# Patient Record
Sex: FEMALE | Race: WHITE | NOT HISPANIC OR LATINO | Employment: OTHER | URBAN - METROPOLITAN AREA
[De-identification: names, ages, dates, MRNs, and addresses within clinical notes are randomized per-mention and may not be internally consistent; named-entity substitution may affect disease eponyms.]

---

## 2018-03-26 ENCOUNTER — TRANSFERRED RECORDS (OUTPATIENT)
Dept: HEALTH INFORMATION MANAGEMENT | Facility: CLINIC | Age: 68
End: 2018-03-26

## 2018-03-26 ENCOUNTER — HOSPITAL ENCOUNTER (EMERGENCY)
Facility: CLINIC | Age: 68
Discharge: HOME | End: 2018-03-26
Attending: INTERNAL MEDICINE | Admitting: INTERNAL MEDICINE
Payer: MEDICARE

## 2018-03-26 ENCOUNTER — APPOINTMENT (OUTPATIENT)
Dept: CT IMAGING | Facility: CLINIC | Age: 68
End: 2018-03-26
Attending: INTERNAL MEDICINE
Payer: MEDICARE

## 2018-03-26 VITALS
WEIGHT: 186 LBS | DIASTOLIC BLOOD PRESSURE: 64 MMHG | TEMPERATURE: 97.4 F | OXYGEN SATURATION: 96 % | SYSTOLIC BLOOD PRESSURE: 111 MMHG | RESPIRATION RATE: 18 BRPM

## 2018-03-26 DIAGNOSIS — J20.9 BRONCHITIS, ACUTE, WITH BRONCHOSPASM: ICD-10-CM

## 2018-03-26 LAB
ALBUMIN SERPL-MCNC: 3.5 G/DL (ref 3.4–5)
ALP SERPL-CCNC: 94 U/L (ref 40–150)
ALT SERPL W P-5'-P-CCNC: 16 U/L (ref 0–50)
ANION GAP SERPL CALCULATED.3IONS-SCNC: 5 MMOL/L (ref 3–14)
AST SERPL W P-5'-P-CCNC: 15 U/L (ref 0–45)
BASOPHILS # BLD AUTO: 0 10E9/L (ref 0–0.2)
BASOPHILS NFR BLD AUTO: 0.8 %
BILIRUB SERPL-MCNC: 0.5 MG/DL (ref 0.2–1.3)
BUN SERPL-MCNC: 18 MG/DL (ref 7–30)
CALCIUM SERPL-MCNC: 8.4 MG/DL (ref 8.5–10.1)
CHLORIDE SERPL-SCNC: 109 MMOL/L (ref 94–109)
CO2 SERPL-SCNC: 28 MMOL/L (ref 20–32)
CREAT BLD-MCNC: 1 MG/DL (ref 0.52–1.04)
CREAT SERPL-MCNC: 0.94 MG/DL (ref 0.52–1.04)
DIFFERENTIAL METHOD BLD: ABNORMAL
EOSINOPHIL # BLD AUTO: 0.1 10E9/L (ref 0–0.7)
EOSINOPHIL NFR BLD AUTO: 2.9 %
ERYTHROCYTE [DISTWIDTH] IN BLOOD BY AUTOMATED COUNT: 13.2 % (ref 10–15)
GFR SERPL CREATININE-BSD FRML MDRD: 55 ML/MIN/1.7M2
GFR SERPL CREATININE-BSD FRML MDRD: 59 ML/MIN/1.7M2
GLUCOSE SERPL-MCNC: 102 MG/DL (ref 70–99)
HCT VFR BLD AUTO: 38.1 % (ref 35–47)
HGB BLD-MCNC: 11.8 G/DL (ref 11.7–15.7)
IMM GRANULOCYTES # BLD: 0 10E9/L (ref 0–0.4)
IMM GRANULOCYTES NFR BLD: 0.2 %
LYMPHOCYTES # BLD AUTO: 2.7 10E9/L (ref 0.8–5.3)
LYMPHOCYTES NFR BLD AUTO: 55.9 %
MCH RBC QN AUTO: 27.6 PG (ref 26.5–33)
MCHC RBC AUTO-ENTMCNC: 31 G/DL (ref 31.5–36.5)
MCV RBC AUTO: 89 FL (ref 78–100)
MONOCYTES # BLD AUTO: 0.4 10E9/L (ref 0–1.3)
MONOCYTES NFR BLD AUTO: 7.7 %
NEUTROPHILS # BLD AUTO: 1.6 10E9/L (ref 1.6–8.3)
NEUTROPHILS NFR BLD AUTO: 32.5 %
NRBC # BLD AUTO: 0 10*3/UL
NRBC BLD AUTO-RTO: 0 /100
PLATELET # BLD AUTO: 300 10E9/L (ref 150–450)
POTASSIUM SERPL-SCNC: 3.7 MMOL/L (ref 3.4–5.3)
PROT SERPL-MCNC: 6.9 G/DL (ref 6.8–8.8)
RBC # BLD AUTO: 4.28 10E12/L (ref 3.8–5.2)
SODIUM SERPL-SCNC: 142 MMOL/L (ref 133–144)
TROPONIN I SERPL-MCNC: <0.015 UG/L (ref 0–0.04)
WBC # BLD AUTO: 4.8 10E9/L (ref 4–11)

## 2018-03-26 PROCEDURE — 82565 ASSAY OF CREATININE: CPT

## 2018-03-26 PROCEDURE — 93005 ELECTROCARDIOGRAM TRACING: CPT

## 2018-03-26 PROCEDURE — 40000275 ZZH STATISTIC RCP TIME EA 10 MIN

## 2018-03-26 PROCEDURE — 80053 COMPREHEN METABOLIC PANEL: CPT | Performed by: INTERNAL MEDICINE

## 2018-03-26 PROCEDURE — 99285 EMERGENCY DEPT VISIT HI MDM: CPT | Mod: 25

## 2018-03-26 PROCEDURE — 71260 CT THORAX DX C+: CPT

## 2018-03-26 PROCEDURE — 93005 ELECTROCARDIOGRAM TRACING: CPT | Mod: 76

## 2018-03-26 PROCEDURE — 85025 COMPLETE CBC W/AUTO DIFF WBC: CPT | Performed by: INTERNAL MEDICINE

## 2018-03-26 PROCEDURE — 25000128 H RX IP 250 OP 636: Performed by: INTERNAL MEDICINE

## 2018-03-26 PROCEDURE — 84484 ASSAY OF TROPONIN QUANT: CPT | Performed by: INTERNAL MEDICINE

## 2018-03-26 PROCEDURE — 25000125 ZZHC RX 250: Performed by: INTERNAL MEDICINE

## 2018-03-26 PROCEDURE — 94640 AIRWAY INHALATION TREATMENT: CPT

## 2018-03-26 RX ORDER — IPRATROPIUM BROMIDE AND ALBUTEROL SULFATE 2.5; .5 MG/3ML; MG/3ML
3 SOLUTION RESPIRATORY (INHALATION) ONCE
Status: COMPLETED | OUTPATIENT
Start: 2018-03-26 | End: 2018-03-26

## 2018-03-26 RX ORDER — IOPAMIDOL 755 MG/ML
500 INJECTION, SOLUTION INTRAVASCULAR ONCE
Status: COMPLETED | OUTPATIENT
Start: 2018-03-26 | End: 2018-03-26

## 2018-03-26 RX ORDER — ALBUTEROL SULFATE 90 UG/1
2 AEROSOL, METERED RESPIRATORY (INHALATION) EVERY 4 HOURS PRN
Qty: 1 INHALER | Refills: 0 | Status: SHIPPED | OUTPATIENT
Start: 2018-03-26

## 2018-03-26 RX ORDER — PREDNISONE 20 MG/1
60 TABLET ORAL ONCE
Status: COMPLETED | OUTPATIENT
Start: 2018-03-26 | End: 2018-03-26

## 2018-03-26 RX ORDER — PREDNISONE 20 MG/1
TABLET ORAL
Qty: 10 TABLET | Refills: 0 | Status: SHIPPED | OUTPATIENT
Start: 2018-03-26 | End: 2023-09-24

## 2018-03-26 RX ORDER — AZITHROMYCIN 250 MG/1
TABLET, FILM COATED ORAL
Qty: 6 TABLET | Refills: 0 | Status: SHIPPED | OUTPATIENT
Start: 2018-03-26 | End: 2018-03-31

## 2018-03-26 RX ADMIN — IPRATROPIUM BROMIDE AND ALBUTEROL SULFATE 3 ML: .5; 3 SOLUTION RESPIRATORY (INHALATION) at 22:27

## 2018-03-26 RX ADMIN — PREDNISONE 60 MG: 20 TABLET ORAL at 22:35

## 2018-03-26 RX ADMIN — IPRATROPIUM BROMIDE AND ALBUTEROL SULFATE 3 ML: .5; 3 SOLUTION RESPIRATORY (INHALATION) at 21:05

## 2018-03-26 RX ADMIN — SODIUM CHLORIDE 83 ML: 9 INJECTION, SOLUTION INTRAVENOUS at 21:56

## 2018-03-26 RX ADMIN — IOPAMIDOL 69 ML: 755 INJECTION, SOLUTION INTRAVENOUS at 21:56

## 2018-03-26 ASSESSMENT — ENCOUNTER SYMPTOMS
COUGH: 1
BACK PAIN: 1
SHORTNESS OF BREATH: 1
NERVOUS/ANXIOUS: 1

## 2018-03-26 NOTE — ED AVS SNAPSHOT
[facility] Emergency Department    [address]    [facility] [address]    Phone:  [phone]    Fax:  [phone]                                       Ghada Dillon   MRN: [id number]    Department:  [facility] Emergency Department   Date of Visit:  3/26/2018           After Visit Summary Signature Page     I have received my discharge instructions, and my questions have been answered. I have discussed any challenges I see with this plan with the nurse or doctor.    ..........................................................................................................................................  Patient/Patient Representative Signature      ..........................................................................................................................................  Patient Representative Print Name and Relationship to Patient    ..................................................               ................................................  Date                                            Time    ..........................................................................................................................................  Reviewed by Signature/Title    ...................................................              ..............................................  Date                                                            Time

## 2018-03-26 NOTE — ED AVS SNAPSHOT
[facility] Emergency Department    [address] [place] [address]    Phone:  [phone]    Fax:  [phone]                                       Ghada Dillon   MRN: [id number]    Department:  [facility] Emergency Department   Date of Visit:  3/26/2018           Patient Information     Date Of Birth          1950        Your diagnoses for this visit were:     Bronchitis, acute, with bronchospasm        You were seen by Cesilia Romero MD.        Discharge Instructions       Discharge Instructions  Bronchitis, Pneumonia, Bronchospasm    You were seen today for a chest infection or inflammation. If your doctor decided this was due to a bacterial infection, you may need an antibiotic. Sometimes these are caused by a virus, and then an antibiotic will not help.     Return to the Emergency Department if:    Your breathing gets much worse.    You are very weak, or feel much more ill.    You develop new symptoms, such as chest pain.    You cough up blood.    You are vomiting enough that you can t keep fluids or your medicine down.    What can I do to help myself?    Fill any prescriptions the doctor gave you and take them right away--especially antibiotics. Be sure to finish the whole antibiotic prescription.    You may be given a prescription for an inhaler, which can help loosen tight air passages.  Use this as needed, but not more often than directed. Inhalers work much better when used with a spacer.     You may be given a prescription for a steroid to reduce inflammation. Used long-term, these can have many serious side effects, but for short courses these do not happen. You may notice restlessness or increased appetite.        You may use non-prescription cough or cold medicines. Cough medicines may help, but don t make the cough go away completely.     Avoid smoke, because this can make your symptoms worse. If you smoke, this may be a good time to  "quit! Consider using nicotine lozenges, gum, or patches to reduce cravings.     If you have a fever, Tylenol  (acetaminophen), Motrin  (ibuprofen), or Advil  (ibuprofen) may help bring fever down and may help you feel more comfortable. Be sure to read and follow the package directions, and ask your doctor if you have questions.    Be sure to get your flu shot each year.  The pneumonia shot can help prevent pneumonia.  Probiotics: If you have been given an antibiotic, you may want to also take a probiotic pill or eat yogurt with live cultures. Probiotics have \"good bacteria\" to help your intestines stay healthy. Studies have shown that probiotics help prevent diarrhea and other intestine problems (including C. diff infection) when you take antibiotics. You can buy these without a prescription in the pharmacy section of the store.     If your doctor has told you to follow-up at your clinic, be sure to call right away and go to your appointment.  If there is any problem with keeping your appointment, call your doctor or return to the Emergency Department.    If you were given a prescription for medicine here today, be sure to read all of the information (including the package insert) that comes with your prescription.  This will include important information about the medicine, its side effects, and any warnings that you need to know about.  The pharmacist who fills the prescription can provide more information and answer questions you may have about the medicine.  If you have questions or concerns that the pharmacist cannot address, please call or return to the Emergency Department.       Remember that you can always come back to the Emergency Department if you are not able to see your regular doctor in the amount of time listed above, if you get any new symptoms, or if there is anything that worries you.        24 Hour Appointment Hotline       To make an appointment at any [facility], call [phone] ([phone]). If you don't have a family doctor or clinic, we will help you find one. [facility] clinics are conveniently located to serve the needs of you and your family.          ED Discharge Orders     SPACER BAG/RESERVOIR                    Review of your medicines      START taking        Dose / Directions Last dose taken    albuterol 108 (90 BASE) MCG/ACT Inhaler   Commonly known as:  PROAIR HFA   Dose:  2 puff   Quantity:  1 Inhaler        Inhale 2 puffs into the lungs every 4 hours as needed for shortness of breath / dyspnea   Refills:  0        azithromycin 250 MG tablet   Commonly known as:  ZITHROMAX Z-GREER   Quantity:  6 tablet        Two tablets on the first day, then one tablet daily for the next 4 days   Refills:  0        predniSONE 20 MG tablet   Commonly known as:  DELTASONE   Quantity:  10 tablet        Take two tablets (= 40mg) each day for 5 (five) days   Refills:  0          Our records show that you are taking the medicines listed below. If these are incorrect, please call your family doctor or clinic.        Dose / Directions Last dose taken    ALBUTEROL IN        Refills:  0        conjugated estrogens cream   Commonly known as:  PREMARIN        Place vaginally once a week   Refills:  0        latanoprost 0.005 % ophthalmic solution   Commonly known as:  XALATAN   Dose:  1 drop        1 drop At Bedtime   Refills:  0                Prescriptions were sent or printed at these locations (3 Prescriptions)                   Other Prescriptions                Printed at Department/Unit printer (3 of 3)         predniSONE (DELTASONE) 20 MG tablet               albuterol (PROAIR HFA) 108 (90 BASE) MCG/ACT Inhaler               azithromycin (ZITHROMAX Z-GREER) 250 MG tablet                Procedures and tests performed during your visit     CBC with platelets differential    CT Chest Pulmonary Embolism w Contrast    Cardiac Continuous Monitoring    Comprehensive metabolic panel    Creatinine POCT     ISTAT creatinine    Peripheral IV: Standard    Pulse oximetry nursing    Troponin I      Orders Needing Specimen Collection     None      Pending Results     No orders found from 3/24/2018 to 3/27/2018.            Pending Culture Results     No orders found from 3/24/2018 to 3/27/2018.            Pending Results Instructions     If you had any lab results that were not finalized at the time of your Discharge, you can call the ED Lab Result RN at [phone]. You will be contacted by this team for any positive Lab results or changes in treatment. The nurses are available 7 days a week from 10A to 6:30P.  You can leave a message 24 hours per day and they will return your call.        Test Results From Your Hospital Stay        3/26/2018  9:24 PM      Component Results     Component Value Ref Range & Units Status    WBC 4.8 4.0 - 11.0 10e9/L Final    RBC Count 4.28 3.8 - 5.2 10e12/L Final    Hemoglobin 11.8 11.7 - 15.7 g/dL Final    Hematocrit 38.1 35.0 - 47.0 % Final    MCV 89 78 - 100 fl Final    MCH 27.6 26.5 - 33.0 pg Final    MCHC 31.0 (L) 31.5 - 36.5 g/dL Final    RDW 13.2 10.0 - 15.0 % Final    Platelet Count 300 150 - 450 10e9/L Final    Diff Method Automated Method  Final    % Neutrophils 32.5 % Final    % Lymphocytes 55.9 % Final    % Monocytes 7.7 % Final    % Eosinophils 2.9 % Final    % Basophils 0.8 % Final    % Immature Granulocytes 0.2 % Final    Nucleated RBCs 0 0 /100 Final    Absolute Neutrophil 1.6 1.6 - 8.3 10e9/L Final    Absolute Lymphocytes 2.7 0.8 - 5.3 10e9/L Final    Absolute Monocytes 0.4 0.0 - 1.3 10e9/L Final    Absolute Eosinophils 0.1 0.0 - 0.7 10e9/L Final    Absolute Basophils 0.0 0.0 - 0.2 10e9/L Final    Abs Immature Granulocytes 0.0 0 - 0.4 10e9/L Final    Absolute Nucleated RBC 0.0  Final         3/26/2018  9:43 PM      Component Results     Component Value Ref Range & Units Status    Troponin I ES <0.015 0.000 - 0.045 ug/L Final    The 99th percentile for upper reference range is  0.045 ug/L.  Troponin values   in the range of 0.045 - 0.120 ug/L may be associated with risks of adverse   clinical events.           3/26/2018  9:43 PM      Component Results     Component Value Ref Range & Units Status    Sodium 142 133 - 144 mmol/L Final    Potassium 3.7 3.4 - 5.3 mmol/L Final    Chloride 109 94 - 109 mmol/L Final    Carbon Dioxide 28 20 - 32 mmol/L Final    Anion Gap 5 3 - 14 mmol/L Final    Glucose 102 (H) 70 - 99 mg/dL Final    Urea Nitrogen 18 7 - 30 mg/dL Final    Creatinine 0.94 0.52 - 1.04 mg/dL Final    GFR Estimate 59 (L) >60 mL/min/1.7m2 Final    Non  GFR Calc    GFR Estimate If Black 72 >60 mL/min/1.7m2 Final    African American GFR Calc    Calcium 8.4 (L) 8.5 - 10.1 mg/dL Final    Bilirubin Total 0.5 0.2 - 1.3 mg/dL Final    Albumin 3.5 3.4 - 5.0 g/dL Final    Protein Total 6.9 6.8 - 8.8 g/dL Final    Alkaline Phosphatase 94 40 - 150 U/L Final    ALT 16 0 - 50 U/L Final    AST 15 0 - 45 U/L Final         3/26/2018 10:37 PM      Narrative     CT CHEST WITH CONTRAST  3/26/2018 10:08 PM    HISTORY: Chest pain and shortness of breath. Evaluate for pulmonary  embolism.    COMPARISON: Radiographs on 11/21/2004.    TECHNIQUE: Routine transverse CT imaging of the chest was performed  following the uneventful intravenous administration of 69mL Isovue-370  contrast. A pulmonary embolism protocol was utilized. Radiation dose  for this scan was reduced using automated exposure control, adjustment  of the mA and/or kV according to patient size, or iterative  reconstruction technique.    FINDINGS: The heart size is mildly enlarged. There is an intrathoracic  goiter on the left. No enlarged lymph node or other abnormal  mediastinal mass is seen. The thoracic aorta is unremarkable. There is  very good opacification of the pulmonary arteries with contrast. No  pulmonary embolism is seen. The lungs are clear. No pneumothorax is  demonstrated. No pleural effusion is identified. There  are  degenerative changes in the spine. No chest wall pathology is seen.  There are surgical changes in the visualized upper abdomen as well as  what appears to be a large right renal cyst.        Impression     IMPRESSION: No acute abnormality is demonstrated. Specifically, no  pulmonary embolism is seen.    ANETA GRANDA MD         3/26/2018  9:18 PM      Component Results     Component Value Ref Range & Units Status    Creatinine 1.0 0.52 - 1.04 mg/dL Final    GFR Estimate 55 (L) >60 mL/min/1.7m2 Final    GFR Estimate If Black 67 >60 mL/min/1.7m2 Final                Clinical Quality Measure: Blood Pressure Screening     Your blood pressure was checked while you were in the emergency department today. The last reading we obtained was  BP: 111/64 (Simultaneous filing. User may not have seen previous data.) . Please read the guidelines below about what these numbers mean and what you should do about them.  If your systolic blood pressure (the top number) is less than 120 and your diastolic blood pressure (the bottom number) is less than 80, then your blood pressure is normal. There is nothing more that you need to do about it.  If your systolic blood pressure (the top number) is 120-139 or your diastolic blood pressure (the bottom number) is 80-89, your blood pressure may be higher than it should be. You should have your blood pressure rechecked within a year by a primary care provider.  If your systolic blood pressure (the top number) is 140 or greater or your diastolic blood pressure (the bottom number) is 90 or greater, you may have high blood pressure. High blood pressure is treatable, but if left untreated over time it can put you at risk for heart attack, stroke, or kidney failure. You should have your blood pressure rechecked by a primary care provider within the next 4 weeks.  If your provider in the emergency department today gave you specific instructions to follow-up with your doctor or provider  "even sooner than that, you should follow that instruction and not wait for up to 4 weeks for your follow-up visit.        Thank you for choosing [facility]       Thank you for choosing [facility] for your care. Our goal is always to provide you with excellent care. Hearing back from our patients is one way we can continue to improve our services. Please take a few minutes to complete the written survey that you may receive in the mail after you visit with us. Thank you!        [employer] Information     [employer] lets you send messages to your doctor, view your test results, renew your prescriptions, schedule appointments and more. To sign up, go to [email or web address] . Click on \"Log in\" on the left side of the screen, which will take you to the Welcome page. Then click on \"Sign up Now\" on the right side of the page.     You will be asked to enter the access code listed below, as well as some personal information. Please follow the directions to create your username and password.     Your access code is: [email or web address]  Expires: 2018 11:31 PM     Your access code will  in 90 days. If you need help or a new code, please call your [facility] clinic or [phone].        Care EveryWhere ID     This is your Care EveryWhere ID. This could be used by other organizations to access your [facility] medical records  [id number]        Equal Access to Services     SHIV HILL : Hadii jhonny Uribe, waaxda luqadaha, qaybta kaalmada sernia, fany lacy. So [facility] [phone].    ATENCIÓN: Si habla español, tiene a kumar disposición servicios gratuitos de asistencia lingüística. Llame al [phone].    We comply with applicable federal civil rights laws and [place] laws. We do not discriminate on the basis of race, color, national origin, age, disability, sex, sexual orientation, or gender identity.            After Visit Summary       This is your record. Keep this with you and " show to your community pharmacist(s) and doctor(s) at your next visit.

## 2018-03-27 LAB
INTERPRETATION ECG - MUSE: NORMAL
INTERPRETATION ECG - MUSE: NORMAL

## 2018-03-27 NOTE — ED PROVIDER NOTES
"  History     Chief Complaint:  Chest Pain and Shortness of Breath     The history is provided by the patient.      Ghada Dillon is a 67 year old female who presents with chest pain and shortness of breath. The patient states that she \"caught an infection\" from a flight from [place] on 2/28/18 and she started taking Mucinex and ibuprofen. Since then she has been having a productive cough with yellow phlegm when she lies flat and intermittent chest pain and shortness of breath. Her chest pain and shortness of breath worsened 2 days ago and she noticed it while she was lying on her right side at bed time. She states it felt like her chest was thumping, almost like anxiety, and went into her back. She went to an urgent care and had chest XR obtained. She was then referred to the ED for further evaluation. Of note, the patient's flight from [place] was delayed and she ended up sitting in the plane for a total of 8 hours. She has no other medical concerns.    XR Chest 2 Views  Negative chest. Both lungs clear.    JAN CELAYA MD    CARDIAC RISK FACTORS:  Sex:    Female  Tobacco:   rare  Hypertension:   Neg  Hyperlipidemia:  Neg  Diabetes:   Neg  Family History:  Pos    PE/DVT RISK FACTORS:  Sex:    Female  Hormones:   Neg  Tobacco:   Neg  Cancer:   Neg  Travel:   Pos  Surgery:   Neg  Other immobilization: Neg  Personal history:  Neg  Family history:  Neg     Allergies:  No known drug allergies      Medications:    Albuterol inhaler    Past Medical History:    Uncomplicated asthma    Past Surgical History:    Eye surgery    Family History:    Heart disease  Diabetes    Social History:  Presents alone   Tobacco use: Current some day smoker  Alcohol use: No  PCP: Physician No Ref-Primary    Marital Status:        Review of Systems   Respiratory: Positive for cough and shortness of breath.    Cardiovascular: Positive for chest pain.   Musculoskeletal: Positive for back pain.   Psychiatric/Behavioral: The " patient is nervous/anxious.    All other systems reviewed and are negative.    Physical Exam     Patient Vitals for the past 24 hrs:   BP Temp Temp src Heart Rate Resp SpO2 Weight   03/26/18 2300 111/64 - - 82 18 96 % -   03/26/18 2245 121/74 - - 86 8 99 % -   03/26/18 2230 106/81 - - 64 (!) 5 100 % -   03/26/18 2227 - - - - - 98 % -   03/26/18 2221 - - - 71 - 96 % -   03/26/18 2215 129/90 - - 66 9 96 % -   03/26/18 2210 - - - 70 - 99 % -   03/26/18 2209 123/87 - - - - - -   03/26/18 2145 - - - - - 97 % -   03/26/18 2130 122/67 - - - - 96 % -   03/26/18 2115 122/70 - - - - 97 % -   03/26/18 2026 143/79 97.4  F (36.3  C) Oral 85 16 100 % 84.4 kg (186 lb)      Physical Exam   Constitutional: She is cooperative.   HENT:   Right Ear: Tympanic membrane normal.   Left Ear: Tympanic membrane normal.   Mouth/Throat: Oropharynx is clear and moist and mucous membranes are normal.   Eyes: Conjunctivae are normal.   Neck: Normal range of motion.   Cardiovascular: Regular rhythm and normal heart sounds.    Pulmonary/Chest: Effort normal. She has wheezes.   Abdominal: Soft. Normal appearance and bowel sounds are normal. There is no rebound and no guarding.   Musculoskeletal: Normal range of motion.   Lymphadenopathy:     She has no cervical adenopathy.   Neurological: She is alert.   Skin: Skin is warm and dry.   Psychiatric: She has a normal mood and affect.         HEART SCORE:    History:   Highly suspicious (2)          Moderately suspicious (1)    1     Slightly suspicious (0)         ECG:   Significant ST depression(1mm continguous)      Non-specific repolarization abnormality  1     Normal          Age:   > 65       2     45-65            < 45           Risk Factors:  3 or more           1-2       1     No risk factors          Troponin:   > 3x normal limit     1-3x normal limit     < normal limit      0    Total:           5      **Risk factors: DM, current or recent smoker (< 90 days), HTN, hyperlipidemia, Fam hx of CAD,  BMI > 30, history of atheroslcerosis    A HEART score of < 3 places their risk of major adverse cardiac event at about 1.7% at 6 weeks (<1% at 30 days).  If repeated troponin at 3 hours likely reduces risk to less than 1%.       Emergency Department Course   ECG (20:35:08):  Rate 70 bpm. NC interval 184. QRS duration 88. QT/QTc 420/453. P-R-T axes 33 -16 8. Normal sinus rhythm. T wave abnormality, consider anterior ischemia. Abnormal ECG. Agree with computer interpretation. Interpreted at 2059 by Cesilia Romero MD.     ECG (22:18:45):  Rate 70 bpm. NC interval 196. QRS duration 84. QT/QTc 434/468. P-R-T axes 38 -20 1. Normal sinus rhythm. Nonspecific T wave abnormality. Abnormal ECG. Agree with computer interpretation. No significant change when compared to earlier EKG.  Interpreted at 2220 by Cesilia Romero MD.     Imaging:  Radiographic findings were communicated with the patient who voiced understanding of the findings.  CT Chest Pulmonary Embolism w Contrast  IMPRESSION: No acute abnormality is demonstrated. Specifically, no pulmonary embolism is seen.    ANETA GRANDA MD    Imaging independently reviewed and agree with radiologist interpretation.      Laboratory:  CBC: WNL (WBC 4.8, HGB 11.8, )    CMP: Glucose 102 (H), GFR Estimate 59 (L), Calcium 8.4 (L), o/w WNL (Creatinine 0.94)   Creatinine POCT: 1.0, GFR Estimate 55 (L)  2113: Troponin: <0.015     Interventions:  2105: Duoneb 3 mLs Nebulization  2227: Duoneb 3 mLs Nebulization  2235: Deltasone 60 mg PO    Emergency Department Course:  Past medical records, nursing notes, and vitals reviewed.  2051: I performed an exam of the patient and obtained history, as documented above.      2259: I rechecked the patient. Findings and plan explained to the Patient. Patient discharged home with instructions regarding supportive care, medications, and reasons to return. The importance of close follow-up was reviewed.      I personally reviewed  the laboratory results with the patient and answered all related questions prior to discharge.   Impression & Plan    Medical Decision Making:  Ghada Dillon is a 67 year old female who was referred from a clinic for further evaluation of chest pain and dyspnea. With her recent prolonged travel there was concern for PE. Thankfully CT Chest rules this out. She does have somewhat abnormal EKG suggestive for ischemia. This is unchanging while here. Her troponin is negative after 2 days of ongoing chest symptoms ruling out cardiac ischemia. Here she has had wheezing and after 2 nebs and steroids she is feeling markedly improved. I suspect acute bronchitis with bronchospasm. I will discharged the patient with albuterol and 5 day course of prednisone as well as azithromycin. Follow up in clinic in the next few days or return for new or worsening symptoms.       Diagnosis:    ICD-10-CM    1. Bronchitis, acute, with bronchospasm J20.9 SPACER BAG/RESERVOIR       Disposition:  Discharged to home with plan as outlined.    Discharge Medications:  New Prescriptions    ALBUTEROL (PROAIR HFA) 108 (90 BASE) MCG/ACT INHALER    Inhale 2 puffs into the lungs every 4 hours as needed for shortness of breath / dyspnea    AZITHROMYCIN (ZITHROMAX Z-GREER) 250 MG TABLET    Two tablets on the first day, then one tablet daily for the next 4 days    PREDNISONE (DELTASONE) 20 MG TABLET    Take two tablets (= 40mg) each day for 5 (five) days         Aquilino Noriega  3/26/2018   [facility] EMERGENCY DEPARTMENT  I, Aquilino Noriega, am serving as a scribe at 8:51 PM on 3/26/2018 to document services personally performed by Cesilia Romero MD based on my observations and the provider's statements to me.       Cesilia Romero MD  03/27/18 0059

## 2018-04-01 ENCOUNTER — HOSPITAL ENCOUNTER (EMERGENCY)
Facility: CLINIC | Age: 68
Discharge: HOME | End: 2018-04-01
Attending: EMERGENCY MEDICINE | Admitting: EMERGENCY MEDICINE
Payer: MEDICARE

## 2018-04-01 ENCOUNTER — APPOINTMENT (OUTPATIENT)
Dept: GENERAL RADIOLOGY | Facility: CLINIC | Age: 68
End: 2018-04-01
Attending: EMERGENCY MEDICINE
Payer: MEDICARE

## 2018-04-01 VITALS
WEIGHT: 186 LBS | DIASTOLIC BLOOD PRESSURE: 95 MMHG | RESPIRATION RATE: 18 BRPM | SYSTOLIC BLOOD PRESSURE: 147 MMHG | BODY MASS INDEX: 30.99 KG/M2 | OXYGEN SATURATION: 99 % | HEART RATE: 57 BPM | HEIGHT: 65 IN | TEMPERATURE: 97.8 F

## 2018-04-01 DIAGNOSIS — J20.9 ACUTE BRONCHITIS, UNSPECIFIED ORGANISM: ICD-10-CM

## 2018-04-01 LAB
ANION GAP SERPL CALCULATED.3IONS-SCNC: 6 MMOL/L (ref 3–14)
BASOPHILS # BLD AUTO: 0 10E9/L (ref 0–0.2)
BASOPHILS NFR BLD AUTO: 0.3 %
BUN SERPL-MCNC: 15 MG/DL (ref 7–30)
CALCIUM SERPL-MCNC: 8 MG/DL (ref 8.5–10.1)
CHLORIDE SERPL-SCNC: 109 MMOL/L (ref 94–109)
CO2 SERPL-SCNC: 28 MMOL/L (ref 20–32)
CREAT SERPL-MCNC: 0.78 MG/DL (ref 0.52–1.04)
DIFFERENTIAL METHOD BLD: ABNORMAL
EOSINOPHIL # BLD AUTO: 0.1 10E9/L (ref 0–0.7)
EOSINOPHIL NFR BLD AUTO: 0.7 %
ERYTHROCYTE [DISTWIDTH] IN BLOOD BY AUTOMATED COUNT: 13.2 % (ref 10–15)
GFR SERPL CREATININE-BSD FRML MDRD: 74 ML/MIN/1.7M2
GLUCOSE SERPL-MCNC: 89 MG/DL (ref 70–99)
HCT VFR BLD AUTO: 34.7 % (ref 35–47)
HGB BLD-MCNC: 10.8 G/DL (ref 11.7–15.7)
IMM GRANULOCYTES # BLD: 0 10E9/L (ref 0–0.4)
IMM GRANULOCYTES NFR BLD: 0.4 %
LYMPHOCYTES # BLD AUTO: 3.7 10E9/L (ref 0.8–5.3)
LYMPHOCYTES NFR BLD AUTO: 52.2 %
MCH RBC QN AUTO: 27.4 PG (ref 26.5–33)
MCHC RBC AUTO-ENTMCNC: 31.1 G/DL (ref 31.5–36.5)
MCV RBC AUTO: 88 FL (ref 78–100)
MONOCYTES # BLD AUTO: 0.4 10E9/L (ref 0–1.3)
MONOCYTES NFR BLD AUTO: 6 %
NEUTROPHILS # BLD AUTO: 2.9 10E9/L (ref 1.6–8.3)
NEUTROPHILS NFR BLD AUTO: 40.4 %
NRBC # BLD AUTO: 0 10*3/UL
NRBC BLD AUTO-RTO: 0 /100
PLATELET # BLD AUTO: 282 10E9/L (ref 150–450)
POTASSIUM SERPL-SCNC: 3.2 MMOL/L (ref 3.4–5.3)
RBC # BLD AUTO: 3.94 10E12/L (ref 3.8–5.2)
SODIUM SERPL-SCNC: 143 MMOL/L (ref 133–144)
TROPONIN I SERPL-MCNC: <0.015 UG/L (ref 0–0.04)
WBC # BLD AUTO: 7.2 10E9/L (ref 4–11)

## 2018-04-01 PROCEDURE — 80048 BASIC METABOLIC PNL TOTAL CA: CPT | Performed by: EMERGENCY MEDICINE

## 2018-04-01 PROCEDURE — 94664 DEMO&/EVAL PT USE INHALER: CPT

## 2018-04-01 PROCEDURE — 71046 X-RAY EXAM CHEST 2 VIEWS: CPT

## 2018-04-01 PROCEDURE — 84484 ASSAY OF TROPONIN QUANT: CPT | Performed by: EMERGENCY MEDICINE

## 2018-04-01 PROCEDURE — 93005 ELECTROCARDIOGRAM TRACING: CPT

## 2018-04-01 PROCEDURE — 85025 COMPLETE CBC W/AUTO DIFF WBC: CPT | Performed by: EMERGENCY MEDICINE

## 2018-04-01 PROCEDURE — 99285 EMERGENCY DEPT VISIT HI MDM: CPT | Mod: 25

## 2018-04-01 RX ORDER — ALBUTEROL SULFATE 0.83 MG/ML
1 SOLUTION RESPIRATORY (INHALATION) EVERY 6 HOURS PRN
Qty: 75 ML | Refills: 0 | Status: SHIPPED | OUTPATIENT
Start: 2018-04-01

## 2018-04-01 ASSESSMENT — ENCOUNTER SYMPTOMS
SHORTNESS OF BREATH: 1
COUGH: 1
FEVER: 0

## 2018-04-01 NOTE — ED NOTES
Bed: ED24  Expected date: 4/1/18  Expected time:   Means of arrival: Ambulance  Comments:  Michelle Ann

## 2018-04-01 NOTE — ED AVS SNAPSHOT
[facility] Emergency Department    [address] [place] [address]    Phone:  [phone]    Fax:  [phone]                                       Ghada Dillon   MRN: [id number]    Department:  [facility] Emergency Department   Date of Visit:  4/1/2018           Patient Information     Date Of Birth          1950        Your diagnoses for this visit were:     Acute bronchitis, unspecified organism        You were seen by Kraig Garcia MD.      Follow-up Information     Follow up with No Ref-Primary, Physician.    Why:  for re-evaluation of your symptoms this week        Discharge Instructions       Discharge Instructions  Bronchitis, Pneumonia, Bronchospasm    You were seen today for a chest infection or inflammation. If your provider decided this was due to a bacterial infection, you may need an antibiotic. Sometimes these are caused by a virus, and then an antibiotic will not help.     Generally, every Emergency Department visit should have a follow-up clinic visit with either a primary or a specialty clinic/provider. Please follow-up as instructed by your emergency provider today.    Return to the Emergency Department if:    Your breathing gets much worse.    You are very weak, or feel much more ill.    You develop new symptoms, such as chest pain.    You cough up blood.    You are vomiting (throwing up) enough that you cannot keep fluids or your medicine down.    What can I do to help myself?    Fill any prescriptions the provider gave you and take them right away--especially antibiotics. Be sure to finish the whole antibiotic prescription.    You may be given a prescription for an inhaler, which can help loosen tight air passages.  Use this as needed, but not more often than directed. Inhalers work much better when used with a spacer.     You may be given a prescription for a steroid to reduce inflammation. Used long-term, these can have side  effects, but for short-term use they are safe. You may notice restlessness or increased appetite.        You may use non-prescription cough or cold medicines. Cough medicines may help, but don t make the cough go away completely.     Avoid smoke, because this can make your symptoms worse. If you smoke, this may be a good time to quit! Consider using nicotine lozenges, gum, or patches to reduce cravings.     If you have a fever, Tylenol  (acetaminophen), Motrin  (ibuprofen), or Advil  (ibuprofen) may help bring fever down and may help you feel more comfortable. Be sure to read and follow the package directions, and ask your provider if you have questions.    Be sure to get your flu shot each year.  For certain ages, the pneumonia shot can help prevent pneumonia.  If you were given a prescription for medicine here today, be sure to read all of the information (including the package insert) that comes with your prescription.  This will include important information about the medicine, its side effects, and any warnings that you need to know about.  The pharmacist who fills the prescription can provide more information and answer questions you may have about the medicine.  If you have questions or concerns that the pharmacist cannot address, please call or return to the Emergency Department.     Remember that you can always come back to the Emergency Department if you are not able to see your regular provider in the amount of time listed above, if you get any new symptoms, or if there is anything that worries you.  Discharge Instructions  Chest Pain    You have been seen today for chest pain or discomfort.  At this time, your provider has found no signs that your chest pain is due to a serious or life-threatening condition, (or you have declined more testing and/or admission to the hospital). However, sometimes there is a serious problem that does not show up right away. Your evaluation today may not be complete and you  may need further testing and evaluation.     Generally, every Emergency Department visit should have a follow-up clinic visit with either a primary or a specialty clinic/provider. Please follow-up as instructed by your emergency provider today.  Return to the Emergency Department if:    Your chest pain changes, gets worse, starts to happen more often, or comes with less activity.    You are newly short of breath.    You get very weak or tired.    You pass out or faint.    You have any new symptoms, like fever, cough, numb legs, or you cough up blood.    You have anything else that worries you.    Until you follow-up with your regular provider, please do the following:    Take one aspirin daily unless you have an allergy or are told not to by your provider.    If a stress test appointment has been made, go to the appointment.    If you have questions, contact your regular provider.    Follow-up with your regular provider/clinic as directed; this is very important.    If you were given a prescription for medicine here today, be sure to read all of the information (including the package insert) that comes with your prescription.  This will include important information about the medicine, its side effects, and any warnings that you need to know about.  The pharmacist who fills the prescription can provide more information and answer questions you may have about the medicine.  If you have questions or concerns that the pharmacist cannot address, please call or return to the Emergency Department.       Remember that you can always come back to the Emergency Department if you are not able to see your regular provider in the amount of time listed above, if you get any new symptoms, or if there is anything that worries you.      24 Hour Appointment Hotline       To make an appointment at any [facility], call [phone] ([phone]). If you don't have a family doctor or clinic, we will help you find one.  [facility] (formerly [facility]) are conveniently located to serve the needs of you and your family.          ED Discharge Orders     Compressor Nebulizer                    Review of your medicines      CONTINUE these medicines which may have CHANGED, or have new prescriptions. If we are uncertain of the size of tablets/capsules you have at home, strength may be listed as something that might have changed.        Dose / Directions Last dose taken    * albuterol 108 (90 BASE) MCG/ACT Inhaler   Commonly known as:  PROAIR HFA   Dose:  2 puff   What changed:  Another medication with the same name was added. Make sure you understand how and when to take each.   Quantity:  1 Inhaler        Inhale 2 puffs into the lungs every 4 hours as needed for shortness of breath / dyspnea   Refills:  0        * albuterol (2.5 MG/3ML) 0.083% neb solution   Dose:  1 vial   What changed:  You were already taking a medication with the same name, and this prescription was added. Make sure you understand how and when to take each.   Quantity:  75 mL        Take 1 vial (2.5 mg) by nebulization every 6 hours as needed for shortness of breath / dyspnea or wheezing   Refills:  0        * Notice:  This list has 2 medication(s) that are the same as other medications prescribed for you. Read the directions carefully, and ask your doctor or other care provider to review them with you.      Our records show that you are taking the medicines listed below. If these are incorrect, please call your family doctor or clinic.        Dose / Directions Last dose taken    ALBUTEROL IN        Refills:  0        conjugated estrogens cream   Commonly known as:  PREMARIN        Place vaginally once a week   Refills:  0        latanoprost 0.005 % ophthalmic solution   Commonly known as:  XALATAN   Dose:  1 drop        1 drop At Bedtime   Refills:  0        predniSONE 20 MG tablet   Commonly known as:  DELTASONE   Quantity:  10 tablet        Take two tablets (= 40mg) each day for  5 (five) days   Refills:  0          ASK your doctor about these medications        Dose / Directions Last dose taken    azithromycin 250 MG tablet   Commonly known as:  ZITHROMAX Z-GREER   Quantity:  6 tablet   Ask about: Should I take this medication?        Two tablets on the first day, then one tablet daily for the next 4 days   Refills:  0                Prescriptions were sent or printed at these locations (1 Prescription)                   Other Prescriptions                Printed at Department/Unit printer (1 of 1)         albuterol (2.5 MG/3ML) 0.083% neb solution                Procedures and tests performed during your visit     Basic metabolic panel    CBC with platelets differential    EKG 12-lead, tracing only    Troponin I    XR Chest 2 Views      Orders Needing Specimen Collection     None      Pending Results     Date and Time Order Name Status Description    4/1/2018 1050 EKG 12-lead, tracing only Preliminary             Pending Culture Results     No orders found from 3/30/2018 to 4/2/2018.            Pending Results Instructions     If you had any lab results that were not finalized at the time of your Discharge, you can call the ED Lab Result RN at [phone]. You will be contacted by this team for any positive Lab results or changes in treatment. The nurses are available 7 days a week from 10A to 6:30P.  You can leave a message 24 hours per day and they will return your call.        Test Results From Your Hospital Stay        4/1/2018 10:59 AM      Component Results     Component Value Ref Range & Units Status    WBC 7.2 4.0 - 11.0 10e9/L Final    RBC Count 3.94 3.8 - 5.2 10e12/L Final    Hemoglobin 10.8 (L) 11.7 - 15.7 g/dL Final    Hematocrit 34.7 (L) 35.0 - 47.0 % Final    MCV 88 78 - 100 fl Final    MCH 27.4 26.5 - 33.0 pg Final    MCHC 31.1 (L) 31.5 - 36.5 g/dL Final    RDW 13.2 10.0 - 15.0 % Final    Platelet Count 282 150 - 450 10e9/L Final    Diff Method Automated Method  Final    %  Neutrophils 40.4 % Final    % Lymphocytes 52.2 % Final    % Monocytes 6.0 % Final    % Eosinophils 0.7 % Final    % Basophils 0.3 % Final    % Immature Granulocytes 0.4 % Final    Nucleated RBCs 0 0 /100 Final    Absolute Neutrophil 2.9 1.6 - 8.3 10e9/L Final    Absolute Lymphocytes 3.7 0.8 - 5.3 10e9/L Final    Absolute Monocytes 0.4 0.0 - 1.3 10e9/L Final    Absolute Eosinophils 0.1 0.0 - 0.7 10e9/L Final    Absolute Basophils 0.0 0.0 - 0.2 10e9/L Final    Abs Immature Granulocytes 0.0 0 - 0.4 10e9/L Final    Absolute Nucleated RBC 0.0  Final         4/1/2018 11:17 AM      Component Results     Component Value Ref Range & Units Status    Sodium 143 133 - 144 mmol/L Final    Potassium 3.2 (L) 3.4 - 5.3 mmol/L Final    Chloride 109 94 - 109 mmol/L Final    Carbon Dioxide 28 20 - 32 mmol/L Final    Anion Gap 6 3 - 14 mmol/L Final    Glucose 89 70 - 99 mg/dL Final    Urea Nitrogen 15 7 - 30 mg/dL Final    Creatinine 0.78 0.52 - 1.04 mg/dL Final    GFR Estimate 74 >60 mL/min/1.7m2 Final    Non  GFR Calc    GFR Estimate If Black 89 >60 mL/min/1.7m2 Final    African American GFR Calc    Calcium 8.0 (L) 8.5 - 10.1 mg/dL Final         4/1/2018 11:17 AM      Component Results     Component Value Ref Range & Units Status    Troponin I ES <0.015 0.000 - 0.045 ug/L Final    The 99th percentile for upper reference range is 0.045 ug/L.  Troponin values   in the range of 0.045 - 0.120 ug/L may be associated with risks of adverse   clinical events.           4/1/2018 11:46 AM      Narrative     CHEST TWO VIEWS   4/1/2018 11:40 AM    HISTORY:  Chest pain.    COMPARISON:  11/21/2004    FINDINGS:  The heart size is normal. No mediastinal pathology is seen.  The lungs are clear. The pulmonary vasculature is normal. No  pneumothorax or pleural effusion is seen. I see no definite change  since the previous examination.         Impression     IMPRESSION:  Unremarkable chest.    ANETA GRANDA MD                 Clinical Quality Measure: Blood Pressure Screening     Your blood pressure was checked while you were in the emergency department today. The last reading we obtained was  BP: (!) 147/95 . Please read the guidelines below about what these numbers mean and what you should do about them.  If your systolic blood pressure (the top number) is less than 120 and your diastolic blood pressure (the bottom number) is less than 80, then your blood pressure is normal. There is nothing more that you need to do about it.  If your systolic blood pressure (the top number) is 120-139 or your diastolic blood pressure (the bottom number) is 80-89, your blood pressure may be higher than it should be. You should have your blood pressure rechecked within a year by a primary care provider.  If your systolic blood pressure (the top number) is 140 or greater or your diastolic blood pressure (the bottom number) is 90 or greater, you may have high blood pressure. High blood pressure is treatable, but if left untreated over time it can put you at risk for heart attack, stroke, or kidney failure. You should have your blood pressure rechecked by a primary care provider within the next 4 weeks.  If your provider in the emergency department today gave you specific instructions to follow-up with your doctor or provider even sooner than that, you should follow that instruction and not wait for up to 4 weeks for your follow-up visit.        Thank you for choosing [facility]       Thank you for choosing [facility] for your care. Our goal is always to provide you with excellent care. Hearing back from our patients is one way we can continue to improve our services. Please take a few minutes to complete the written survey that you may receive in the mail after you visit with us. Thank you!        [facility] Information     [employer] lets you send messages to your doctor, view your test results, renew your prescriptions, schedule appointments and more. To sign  "up, go to [email or web address] . Click on \"Log in\" on the left side of the screen, which will take you to the Welcome page. Then click on \"Sign up Now\" on the right side of the page.     You will be asked to enter the access code listed below, as well as some personal information. Please follow the directions to create your username and password.     Your access code is: [email or web address]  Expires: 2018 11:31 PM     Your access code will  in 90 days. If you need help or a new code, please call your [facility] clinic or [phone].        Care EveryWhere ID     This is your Care EveryWhere ID. This could be used by other organizations to access your [facility] medical records  [id number]        Equal Access to Services     SHIV HILL : Erma Uribe, mac vila, felicita smalls, fany lacy. So [facility] [phone].    ATENCIÓN: Si habla español, tiene a kumar disposición servicios gratuitos de asistencia lingüística. Llame al [phone].    We comply with applicable federal civil rights laws and [place] laws. We do not discriminate on the basis of race, color, national origin, age, disability, sex, sexual orientation, or gender identity.            After Visit Summary       This is your record. Keep this with you and show to your community pharmacist(s) and doctor(s) at your next visit.                  "

## 2018-04-01 NOTE — ED AVS SNAPSHOT
[facility] Emergency Department    [address]    [facility] [address]    Phone:  [phone]    Fax:  [phone]                                       Ghada Dillon   MRN: [id number]    Department:  [facility] Emergency Department   Date of Visit:  4/1/2018           After Visit Summary Signature Page     I have received my discharge instructions, and my questions have been answered. I have discussed any challenges I see with this plan with the nurse or doctor.    ..........................................................................................................................................  Patient/Patient Representative Signature      ..........................................................................................................................................  Patient Representative Print Name and Relationship to Patient    ..................................................               ................................................  Date                                            Time    ..........................................................................................................................................  Reviewed by Signature/Title    ...................................................              ..............................................  Date                                                            Time

## 2018-04-01 NOTE — ED PROVIDER NOTES
"  History     Chief Complaint:  Chest Pain    HPI   Ghada Dillon is a 67 year old female who presents to the emergency department today for evaluation of chest pain. Per chart review, the patient was seen and evaluated in the ED six days ago on 3/26 for evaluation of chest pain and associated cough and shortness of breath. At this time, she had unremarkable blood work and a CT scan performed as per below. She was diagnosed with bronchitis and discharged to home with prescriptions for prednisone, albuterol, and azithromycin.  Since, this time, patient states she has overall felt good aside from a \"thickness in her chest\" that she is unable to cough up. Then this morning at 0950, an hour prior to arrival, while sitting at the computer she had a sudden onset of left sided sharp \"very intense\" chest pain and associated with shortness of breath. The pain was intermittent and had 3-4 episodes before contacting EMS and presenting to the ED for further evaluation. Patient received four aspirin and three nitro en route which improved her pain from 7/10 to 4/10. Upon presentation, patient states she's never had symptoms like this before. She further describes her pain as if \"something insides the lungs is tearing\" and \"unbearable.\" The patient has no other concerns at this time.     CT Chest Pulmonary Embolism w Contrast - 3/26/18  IMPRESSION: No acute abnormality is demonstrated. Specifically, no pulmonary embolism is seen.  ANETA GRANDA MD    Allergies:  No Known Drug Allergies     Medications:    ALBUTEROL IN  predniSONE (DELTASONE) 20 MG tablet  albuterol (PROAIR HFA) 108 (90 BASE) MCG/ACT Inhaler  conjugated estrogens (PREMARIN) vaginal cream    Past Medical History:    Asthma    Past Surgical History:    Eye surgery    Family History:    History reviewed. No pertinent family history.     Social History:  The patient was accompanied to the ED by her son.  Smoking Status: Current  Alcohol Use: No  Marital " "Status:        Review of Systems   Constitutional: Negative for fever.   Respiratory: Positive for cough and shortness of breath.    Cardiovascular: Positive for chest pain.   All other systems reviewed and are negative.    Physical Exam     Patient Vitals for the past 24 hrs:   BP Temp Pulse Heart Rate Resp SpO2 Height Weight   04/01/18 1045 - - - 68 - 97 % - -   04/01/18 1040 139/88 97.8  F (36.6  C) 57 57 18 97 % 1.651 m (5' 5\") 84.4 kg (186 lb)   04/01/18 1036 139/88 - - - - - - -     Physical Exam  General: Patient is alert and cooperative.  HENT:  Normal nose, oropharynx. Moist oral mucosa.  Eyes: EOMI. Normal conjunctiva.  Neck:  Normal range of motion and appearance.   Cardiovascular:  Normal rate, regular rhythm and normal heart sounds.   Pulmonary/Chest:  Effort normal. No wheezing or crackles.  Abdominal: Soft. No distension or tenderness.     Musculoskeletal: Normal range of motion. No edema or tenderness.   Neurological: oriented, normal strength, sensation, and coordination.   Skin: Warm and dry. No rash or bruising.   Psychiatric: Normal mood and affect. Normal behavior and judgement.      Emergency Department Course     ECG:  Indication: Chest pain   Completed at 1055.  Read at 1057.   Sinus bradycardia  Nonspecific T wave abnormality  Abnormal ECG   Rate 51 bpm. MT interval 180. QRS duration 84. QT/QTc 464/427. P-R-T axes 26 -19 6.    Imaging:  Radiology findings were communicated with the patient and family who voiced understanding of the findings.  XR Chest 2 Views  IMPRESSION:  Unremarkable chest.  Report per radiology     Laboratory:  Laboratory findings were communicated with the patient and family who voiced understanding of the findings.  CBC: HGB 10.8 (L) o/w WNL. (WBC 7.2, )   BMP: Potassium 3.2 (L), Calcium 8.0 (L) o/w WNL (Creatinine 0.78)  Troponin (Collected 1046): <0.015    Emergency Department Course:  Nursing notes and vitals reviewed.  The patient was sent for a XR " Chest 2 Views while in the emergency department, results above.   IV was inserted and blood was drawn for laboratory testing, results above.  1045: I performed an exam of the patient as documented above.   1152: Patient rechecked and updated. Patient is mainly complaining of chest congestion and inability to cough out phlegm. She was offered admission for formal rule out and formal testing, however, she declines this and would like to go home and follow up in clinic.   Findings and plan explained to the Patient and son. Patient discharged home with instructions regarding supportive care, medications, and reasons to return. The importance of close follow-up was reviewed. The patient was prescribed an albuterol neb.  I personally reviewed the laboratory and imaging results with the Patient and son and answered all related questions prior to discharge.    Impression & Plan      Medical Decision Making:  This is a well-appearing and afebrile 67-year-old female who returns to the emergency department with complaints of chest congestion frequent cough and chest discomfort.  She was seen by my colleague on March 26 with similar symptoms.  Her workup at that time was negative and did include a CT scan of the chest with contrast which demonstrated no PE or pneumonia.  She has the sense that she cannot clear phlegm or sputum from her chest became short of breath with this and experienced some poorly characterized chest discomfort prompting a 911 call.  Paramedics did administer nitroglycerin which patient believes helped with her discomfort.  She has a normal cardiopulmonary examination.  She has no history of cardiac disease and did have a negative stress echocardiogram in 2017.  Workup appears included a normal chest x-ray electrocardiogram and troponin.  My suspicion for an ischemic process is low but I did explain to her that it cannot be ruled out and offered admission for a formal rule out and further testing.  She  however does not feel that this is necessary and is presently more focused on the fact that she is having difficulty clearing phlegm from her airway.  She is requesting a home neb machine which I have arranged for and is comfortable going home and following up with her clinic later this week.  I suspect in the end she has a viral respiratory illness turned into bronchitis.    Diagnosis:    ICD-10-CM    1. Acute bronchitis, unspecified organism J20.9 Compressor Nebulizer     Disposition:  Discharged to home    Discharge Medications:  New Prescriptions    ALBUTEROL (2.5 MG/3ML) 0.083% NEB SOLUTION    Take 1 vial (2.5 mg) by nebulization every 6 hours as needed for shortness of breath / dyspnea or wheezing       Scribe Disclosure:  I, Ashwini Orlando, am serving as a scribe at 10:40 AM on 4/1/2018 to document services personally performed by Kraig Garcia MD based on my observations and the provider's statements to me.     4/1/2018   [facility] EMERGENCY DEPARTMENT       Kraig Garcia MD  04/02/18 [id number]

## 2018-04-01 NOTE — ED NOTES
Patient presents with complaints of chest pain which started at 0950. Patient describes the pain as stabbing and came in waves. Pain was also associated with SOB. Patient recived 3 nitro and Asprin in route. Nitro improved pain. ABC intact without need for intervention.

## 2018-04-01 NOTE — DISCHARGE INSTRUCTIONS
Discharge Instructions  Bronchitis, Pneumonia, Bronchospasm    You were seen today for a chest infection or inflammation. If your provider decided this was due to a bacterial infection, you may need an antibiotic. Sometimes these are caused by a virus, and then an antibiotic will not help.     Generally, every Emergency Department visit should have a follow-up clinic visit with either a primary or a specialty clinic/provider. Please follow-up as instructed by your emergency provider today.    Return to the Emergency Department if:    Your breathing gets much worse.    You are very weak, or feel much more ill.    You develop new symptoms, such as chest pain.    You cough up blood.    You are vomiting (throwing up) enough that you cannot keep fluids or your medicine down.    What can I do to help myself?    Fill any prescriptions the provider gave you and take them right away--especially antibiotics. Be sure to finish the whole antibiotic prescription.    You may be given a prescription for an inhaler, which can help loosen tight air passages.  Use this as needed, but not more often than directed. Inhalers work much better when used with a spacer.     You may be given a prescription for a steroid to reduce inflammation. Used long-term, these can have side effects, but for short-term use they are safe. You may notice restlessness or increased appetite.        You may use non-prescription cough or cold medicines. Cough medicines may help, but don t make the cough go away completely.     Avoid smoke, because this can make your symptoms worse. If you smoke, this may be a good time to quit! Consider using nicotine lozenges, gum, or patches to reduce cravings.     If you have a fever, Tylenol  (acetaminophen), Motrin  (ibuprofen), or Advil  (ibuprofen) may help bring fever down and may help you feel more comfortable. Be sure to read and follow the package directions, and ask your provider if you have questions.    Be sure  to get your flu shot each year.  For certain ages, the pneumonia shot can help prevent pneumonia.  If you were given a prescription for medicine here today, be sure to read all of the information (including the package insert) that comes with your prescription.  This will include important information about the medicine, its side effects, and any warnings that you need to know about.  The pharmacist who fills the prescription can provide more information and answer questions you may have about the medicine.  If you have questions or concerns that the pharmacist cannot address, please call or return to the Emergency Department.     Remember that you can always come back to the Emergency Department if you are not able to see your regular provider in the amount of time listed above, if you get any new symptoms, or if there is anything that worries you.  Discharge Instructions  Chest Pain    You have been seen today for chest pain or discomfort.  At this time, your provider has found no signs that your chest pain is due to a serious or life-threatening condition, (or you have declined more testing and/or admission to the hospital). However, sometimes there is a serious problem that does not show up right away. Your evaluation today may not be complete and you may need further testing and evaluation.     Generally, every Emergency Department visit should have a follow-up clinic visit with either a primary or a specialty clinic/provider. Please follow-up as instructed by your emergency provider today.  Return to the Emergency Department if:    Your chest pain changes, gets worse, starts to happen more often, or comes with less activity.    You are newly short of breath.    You get very weak or tired.    You pass out or faint.    You have any new symptoms, like fever, cough, numb legs, or you cough up blood.    You have anything else that worries you.    Until you follow-up with your regular provider, please do the  following:    Take one aspirin daily unless you have an allergy or are told not to by your provider.    If a stress test appointment has been made, go to the appointment.    If you have questions, contact your regular provider.    Follow-up with your regular provider/clinic as directed; this is very important.    If you were given a prescription for medicine here today, be sure to read all of the information (including the package insert) that comes with your prescription.  This will include important information about the medicine, its side effects, and any warnings that you need to know about.  The pharmacist who fills the prescription can provide more information and answer questions you may have about the medicine.  If you have questions or concerns that the pharmacist cannot address, please call or return to the Emergency Department.       Remember that you can always come back to the Emergency Department if you are not able to see your regular provider in the amount of time listed above, if you get any new symptoms, or if there is anything that worries you.

## 2018-04-02 LAB — INTERPRETATION ECG - MUSE: NORMAL

## 2018-12-22 ENCOUNTER — OFFICE VISIT (OUTPATIENT)
Dept: URGENT CARE | Facility: URGENT CARE | Age: 68
End: 2018-12-22
Payer: COMMERCIAL

## 2018-12-22 VITALS
BODY MASS INDEX: 36.69 KG/M2 | DIASTOLIC BLOOD PRESSURE: 84 MMHG | SYSTOLIC BLOOD PRESSURE: 122 MMHG | OXYGEN SATURATION: 96 % | HEART RATE: 81 BPM | WEIGHT: 220.5 LBS | TEMPERATURE: 98.9 F

## 2018-12-22 DIAGNOSIS — K62.3 RECTAL PROLAPSE: ICD-10-CM

## 2018-12-22 DIAGNOSIS — R19.7 DIARRHEA, UNSPECIFIED TYPE: Primary | ICD-10-CM

## 2018-12-22 PROCEDURE — 99203 OFFICE O/P NEW LOW 30 MIN: CPT | Performed by: FAMILY MEDICINE

## 2018-12-23 NOTE — PATIENT INSTRUCTIONS
follow up with a gastroenterologist for further evaluation as soon as possible.      If you feel like you're going to pass out or if unable to produce any urine, go to the emergency room.         .

## 2018-12-23 NOTE — PROGRESS NOTES
SUBJECTIVE:   Ghada Dillon is a 68 year old female presenting with a chief complaint of explosive, gassy diarrhea to the point where she cannot make it to the toilet.  This started about four days ago and the these explosive stools occur every time she drinks a few sips of water.  Mouth feels dry.  Eyes also feel dry.  No abdominal cramps.  Not much blood or blood clots from the stools.  She can still produce urine about four times a day.       Patient was evaluated at the [facility] emergency room on December 16, 2018 for a one-week history of a new-onset hanging rectal prolapse,.  The rectal exam showed no rectal prolapse, no external nor internal hemorrhoids and no gross blood on digital rectal exam.  Patient was told to apply hemorrhoid cream (Lucy-Sol) and to see a colorectal surgeon.  Patient states that the soonest appointment is January 4, 2018.      .      Past Medical History:   Diagnosis Date     Uncomplicated asthma      Current Outpatient Medications   Medication Sig Dispense Refill     albuterol (2.5 MG/3ML) 0.083% neb solution Take 1 vial (2.5 mg) by nebulization every 6 hours as needed for shortness of breath / dyspnea or wheezing 75 mL 0     albuterol (PROAIR HFA) 108 (90 BASE) MCG/ACT Inhaler Inhale 2 puffs into the lungs every 4 hours as needed for shortness of breath / dyspnea 1 Inhaler 0     ALBUTEROL IN        conjugated estrogens (PREMARIN) vaginal cream Place vaginally once a week       hydrocortisone (ANUSOL-HC) 2.5 % cream Apply rectally two times per day as needed for rectal pain or itching       latanoprost (XALATAN) 0.005 % ophthalmic solution 1 drop At Bedtime       predniSONE (DELTASONE) 20 MG tablet Take two tablets (= 40mg) each day for 5 (five) days 10 tablet 0     Social History     Tobacco Use     Smoking status: Current Some Day Smoker     Smokeless tobacco: Never Used   Substance Use Topics     Alcohol use: No       OBJECTIVE:  /84 (BP Location: Right arm,  Patient Position: Sitting, Cuff Size: Adult Large)   Pulse 81   Temp 98.9  F (37.2  C) (Tympanic)   Wt 100 kg (220 lb 8 oz)   SpO2 96%   BMI 36.69 kg/m    GENERAL APPEARANCE: healthy, alert and in some pain, due to her current headache.     ASSESSMENT:  Recent history of rectal prolapse  Explosive diarrhea (only after drinking fluids).      PLAN:  I ordered a referral for the patient to see a gastroenterologist, hopefully sooner than January 4, 2018.  Patient will call a gastroenterologist on call tonight for further guidance on the logistics of getting an appointment sooner, given that many clinics will be closed this upcoming Christmas Eve and Chucho Day (Monday and Tuesday in 2018).     Go to the emergency room if lightheaded or if unable to produce any more urine.        Michael Stokes MD

## 2020-04-08 ENCOUNTER — HOSPITAL ENCOUNTER (EMERGENCY)
Facility: CLINIC | Age: 70
Discharge: HOME | End: 2020-04-08
Attending: EMERGENCY MEDICINE | Admitting: EMERGENCY MEDICINE
Payer: COMMERCIAL

## 2020-04-08 ENCOUNTER — APPOINTMENT (OUTPATIENT)
Dept: CT IMAGING | Facility: CLINIC | Age: 70
End: 2020-04-08
Attending: EMERGENCY MEDICINE
Payer: COMMERCIAL

## 2020-04-08 VITALS
BODY MASS INDEX: 36.69 KG/M2 | TEMPERATURE: 97.8 F | HEART RATE: 63 BPM | RESPIRATION RATE: 24 BRPM | DIASTOLIC BLOOD PRESSURE: 96 MMHG | SYSTOLIC BLOOD PRESSURE: 153 MMHG | HEIGHT: 65 IN | OXYGEN SATURATION: 97 %

## 2020-04-08 DIAGNOSIS — R07.9 CHEST PAIN, UNSPECIFIED TYPE: ICD-10-CM

## 2020-04-08 DIAGNOSIS — R06.00 DYSPNEA, UNSPECIFIED TYPE: ICD-10-CM

## 2020-04-08 LAB
ANION GAP SERPL CALCULATED.3IONS-SCNC: 3 MMOL/L (ref 3–14)
BASOPHILS # BLD AUTO: 0 10E9/L (ref 0–0.2)
BASOPHILS NFR BLD AUTO: 0.3 %
BUN SERPL-MCNC: 18 MG/DL (ref 7–30)
CALCIUM SERPL-MCNC: 8.9 MG/DL (ref 8.5–10.1)
CHLORIDE SERPL-SCNC: 106 MMOL/L (ref 94–109)
CO2 SERPL-SCNC: 29 MMOL/L (ref 20–32)
CREAT SERPL-MCNC: 0.84 MG/DL (ref 0.52–1.04)
D DIMER PPP FEU-MCNC: 1.1 UG/ML FEU (ref 0–0.5)
DIFFERENTIAL METHOD BLD: ABNORMAL
EOSINOPHIL # BLD AUTO: 0.1 10E9/L (ref 0–0.7)
EOSINOPHIL NFR BLD AUTO: 2.2 %
ERYTHROCYTE [DISTWIDTH] IN BLOOD BY AUTOMATED COUNT: 14 % (ref 10–15)
GFR SERPL CREATININE-BSD FRML MDRD: 71 ML/MIN/{1.73_M2}
GLUCOSE SERPL-MCNC: 99 MG/DL (ref 70–99)
HCT VFR BLD AUTO: 39.9 % (ref 35–47)
HGB BLD-MCNC: 12.1 G/DL (ref 11.7–15.7)
IMM GRANULOCYTES # BLD: 0 10E9/L (ref 0–0.4)
IMM GRANULOCYTES NFR BLD: 0.5 %
LYMPHOCYTES # BLD AUTO: 2.5 10E9/L (ref 0.8–5.3)
LYMPHOCYTES NFR BLD AUTO: 42.5 %
MCH RBC QN AUTO: 26.9 PG (ref 26.5–33)
MCHC RBC AUTO-ENTMCNC: 30.3 G/DL (ref 31.5–36.5)
MCV RBC AUTO: 89 FL (ref 78–100)
MONOCYTES # BLD AUTO: 0.4 10E9/L (ref 0–1.3)
MONOCYTES NFR BLD AUTO: 7.2 %
NEUTROPHILS # BLD AUTO: 2.8 10E9/L (ref 1.6–8.3)
NEUTROPHILS NFR BLD AUTO: 47.3 %
NRBC # BLD AUTO: 0 10*3/UL
NRBC BLD AUTO-RTO: 0 /100
PLATELET # BLD AUTO: 334 10E9/L (ref 150–450)
POTASSIUM SERPL-SCNC: 3.9 MMOL/L (ref 3.4–5.3)
RBC # BLD AUTO: 4.49 10E12/L (ref 3.8–5.2)
SODIUM SERPL-SCNC: 138 MMOL/L (ref 133–144)
TROPONIN I SERPL-MCNC: <0.015 UG/L (ref 0–0.04)
WBC # BLD AUTO: 6 10E9/L (ref 4–11)

## 2020-04-08 PROCEDURE — 25000131 ZZH RX MED GY IP 250 OP 636 PS 637: Performed by: EMERGENCY MEDICINE

## 2020-04-08 PROCEDURE — 80048 BASIC METABOLIC PNL TOTAL CA: CPT | Performed by: EMERGENCY MEDICINE

## 2020-04-08 PROCEDURE — 71275 CT ANGIOGRAPHY CHEST: CPT

## 2020-04-08 PROCEDURE — 25000132 ZZH RX MED GY IP 250 OP 250 PS 637: Performed by: EMERGENCY MEDICINE

## 2020-04-08 PROCEDURE — 84484 ASSAY OF TROPONIN QUANT: CPT | Performed by: EMERGENCY MEDICINE

## 2020-04-08 PROCEDURE — 25000125 ZZHC RX 250: Performed by: EMERGENCY MEDICINE

## 2020-04-08 PROCEDURE — 25000128 H RX IP 250 OP 636: Performed by: EMERGENCY MEDICINE

## 2020-04-08 PROCEDURE — 85379 FIBRIN DEGRADATION QUANT: CPT | Performed by: EMERGENCY MEDICINE

## 2020-04-08 PROCEDURE — 99285 EMERGENCY DEPT VISIT HI MDM: CPT | Mod: 25

## 2020-04-08 PROCEDURE — 85025 COMPLETE CBC W/AUTO DIFF WBC: CPT | Performed by: EMERGENCY MEDICINE

## 2020-04-08 RX ORDER — LORAZEPAM 1 MG/1
1 TABLET ORAL ONCE
Status: COMPLETED | OUTPATIENT
Start: 2020-04-08 | End: 2020-04-08

## 2020-04-08 RX ORDER — LORAZEPAM 2 MG/ML
0.5 INJECTION INTRAMUSCULAR
Status: DISCONTINUED | OUTPATIENT
Start: 2020-04-08 | End: 2020-04-08

## 2020-04-08 RX ORDER — ASPIRIN 81 MG/1
324 TABLET, CHEWABLE ORAL ONCE
Status: COMPLETED | OUTPATIENT
Start: 2020-04-08 | End: 2020-04-08

## 2020-04-08 RX ORDER — IOPAMIDOL 755 MG/ML
500 INJECTION, SOLUTION INTRAVASCULAR ONCE
Status: COMPLETED | OUTPATIENT
Start: 2020-04-08 | End: 2020-04-08

## 2020-04-08 RX ORDER — PREDNISONE 20 MG/1
60 TABLET ORAL ONCE
Status: COMPLETED | OUTPATIENT
Start: 2020-04-08 | End: 2020-04-08

## 2020-04-08 RX ORDER — PREDNISONE 20 MG/1
TABLET ORAL
Qty: 10 TABLET | Refills: 0 | Status: SHIPPED | OUTPATIENT
Start: 2020-04-08 | End: 2023-09-24

## 2020-04-08 RX ADMIN — PREDNISONE 60 MG: 20 TABLET ORAL at 15:59

## 2020-04-08 RX ADMIN — LORAZEPAM 1 MG: 1 TABLET ORAL at 13:58

## 2020-04-08 RX ADMIN — ASPIRIN 81 MG 324 MG: 81 TABLET ORAL at 13:58

## 2020-04-08 RX ADMIN — IOPAMIDOL 61 ML: 755 INJECTION, SOLUTION INTRAVENOUS at 16:36

## 2020-04-08 RX ADMIN — SODIUM CHLORIDE 81 ML: 9 INJECTION, SOLUTION INTRAVENOUS at 16:36

## 2020-04-08 ASSESSMENT — ENCOUNTER SYMPTOMS
COUGH: 0
VOMITING: 0
DIARRHEA: 0
FEVER: 0
SHORTNESS OF BREATH: 1
FATIGUE: 1

## 2020-04-08 NOTE — ED NOTES
Pt discharged home. Verbal and written instructions given and explained. All questions answered. Pt given information for self isolation.

## 2020-04-08 NOTE — ED AVS SNAPSHOT
[facility] Emergency Department  [address]  [facility] [address]  Phone:  [phone]  Fax:  [phone]                                    Ghada Dillon   MRN: [id number]    Department:  [facility] Emergency Department   Date of Visit:  4/8/2020           After Visit Summary Signature Page    I have received my discharge instructions, and my questions have been answered. I have discussed any challenges I see with this plan with the nurse or doctor.    ..........................................................................................................................................  Patient/Patient Representative Signature      ..........................................................................................................................................  Patient Representative Print Name and Relationship to Patient    ..................................................               ................................................  Date                                   Time    ..........................................................................................................................................  Reviewed by Signature/Title    ...................................................              ..............................................  Date                                               Time          22EPIC Rev 08/18
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
## 2020-04-09 LAB — INTERPRETATION ECG - MUSE: NORMAL

## 2020-11-04 ENCOUNTER — APPOINTMENT (OUTPATIENT)
Dept: GENERAL RADIOLOGY | Facility: CLINIC | Age: 70
End: 2020-11-04
Attending: EMERGENCY MEDICINE
Payer: COMMERCIAL

## 2020-11-04 ENCOUNTER — HOSPITAL ENCOUNTER (EMERGENCY)
Facility: CLINIC | Age: 70
Discharge: HOME | End: 2020-11-04
Attending: EMERGENCY MEDICINE | Admitting: EMERGENCY MEDICINE
Payer: COMMERCIAL

## 2020-11-04 ENCOUNTER — APPOINTMENT (OUTPATIENT)
Dept: ULTRASOUND IMAGING | Facility: CLINIC | Age: 70
End: 2020-11-04
Attending: EMERGENCY MEDICINE
Payer: COMMERCIAL

## 2020-11-04 VITALS
TEMPERATURE: 98.5 F | SYSTOLIC BLOOD PRESSURE: 146 MMHG | HEART RATE: 62 BPM | DIASTOLIC BLOOD PRESSURE: 88 MMHG | OXYGEN SATURATION: 97 % | RESPIRATION RATE: 17 BRPM

## 2020-11-04 DIAGNOSIS — M79.662 PAIN OF LEFT LOWER LEG: ICD-10-CM

## 2020-11-04 LAB — GLUCOSE BLDC GLUCOMTR-MCNC: 112 MG/DL (ref 70–99)

## 2020-11-04 PROCEDURE — 73502 X-RAY EXAM HIP UNI 2-3 VIEWS: CPT

## 2020-11-04 PROCEDURE — 73562 X-RAY EXAM OF KNEE 3: CPT | Mod: LT

## 2020-11-04 PROCEDURE — 93971 EXTREMITY STUDY: CPT | Mod: LT

## 2020-11-04 PROCEDURE — 999N001017 HC STATISTIC GLUCOSE BY METER IP

## 2020-11-04 PROCEDURE — 99285 EMERGENCY DEPT VISIT HI MDM: CPT | Mod: 25

## 2020-11-04 ASSESSMENT — ENCOUNTER SYMPTOMS: ARTHRALGIAS: 1

## 2020-11-04 NOTE — ED TRIAGE NOTES
Sudden onset of left knee pain at 0300. Hx TKA 1999. A&O x 4 with VSS on RA. Send from [facility] for concerns of dvt.   
Applied

## 2020-11-04 NOTE — ED AVS SNAPSHOT
[facility] Emergency Dept  [address]  [facility] [address]  Phone: [phone]  Fax: [phone]                                    Ghada Dillon   MRN: [id number]    Department: [facility] Emergency Dept   Date of Visit: 11/4/2020           After Visit Summary Signature Page    I have received my discharge instructions, and my questions have been answered. I have discussed any challenges I see with this plan with the nurse or doctor.    ..........................................................................................................................................  Patient/Patient Representative Signature      ..........................................................................................................................................  Patient Representative Print Name and Relationship to Patient    ..................................................               ................................................  Date                                   Time    ..........................................................................................................................................  Reviewed by Signature/Title    ...................................................              ..............................................  Date                                               Time          22EPIC Rev 08/18

## 2020-11-04 NOTE — ED NOTES
Pt reports numbness in her L. Toes. She also states she has some wounds on her R. Lower leg. Pt denies diabetes hx but would like her sugar checked.

## 2020-11-04 NOTE — ED PROVIDER NOTES
History   Chief Complaint  Leg Pain    HPI   Ghada Dillon is a 70 year old female with a history of left knee replacement 21 years ago who presents with her daughter for evaluation of left leg pain. The patient reports that she was laying in bed last night when she re-adjusted sleeping positions to get comfortable and suddenly felt a pain on the lateral aspect of her left knee. She states that she was unable to move her leg secondary to this pain. Today, she is unable to ambulate on the leg on her own. She also notes that all of her left toes have been numb. Here in the ED, she reports that her pain is a 10/10 in severity while walking but that it is a 1-2/10 while laying down. She denies taking any medications for the pain.     Allergies  No Known Drug Allergies    Medications  Albuterol  Deltasone    Past Medical History  Asthma  GERD  Obesity  Depression    Past Surgical History  Eye surgery  Left knee replacement    Family History  Arthritis    Social History  Tobacco use: former smoker  Alcohol use: no  Drug use: no  Marital Status:    Presents with her daughter     Review of Systems   Musculoskeletal: Positive for arthralgias.   All other systems reviewed and are negative.    Physical Exam     Patient Vitals for the past 24 hrs:   BP Temp Temp src Pulse Resp SpO2   11/04/20 1710 127/76 -- -- 53 -- 100 %   11/04/20 1610 (!) 158/58 -- -- -- -- --   11/04/20 1408 (!) 142/90 98.5  F (36.9  C) Temporal 62 17 97 %     Physical Exam    GEN: alert, well nourished elderly female appearing about her stated age sitting comfortably currently on the [place] at this time.    HEAD: atraumatic    EYES: pupils reactive, extraocular muscles intact, conjunctivae normal    ENT: TMs normal as are EACs; nares patent; normal posterior pharynx and oral mucosa    NECK: no posterior midline tenderness, no meningeal signs, trachea midline     RESPIRATORY: no tachypnea, breath sounds clear to auscultation    CVS: normal  S1/S2, no murmurs/rubs/gallops    ABDOMEN: soft, nontender, no masses or organomegaly, no rebound, positive bowel sounds    MUSCULOSKELETAL: Bilateral scars consistent with total knee replacement well-healed.  Left knee appears symmetrical without of the right.  There is a small half dollar size contusion noted to the left lateral knee.  The patient has palpable tenderness of the quadricep muscle approximately from this point up to the hip area on the left.  There does not appear to be joint effusion present.  There is good internal and external rotation at both hips that are symmetrical passive flexion of the left knee causes her some pain in the back of the knee.  There is no increased warmth or erythema noted to the extremity.  Pulses are intact distally with pink warm toes bilaterally.    SKIN: warm and dry, no acute rashes or ulceration, no erythema     NEURO: GCS 15, cranial nerves intact.  Motor and sensory- good tone; normal gait and coordination    LYMPH: no lymphadenopathy    Emergency Department Course   Imaging:  Radiology findings were communicated with the patient who voiced understanding of the findings.    XR Pelvis with Hip left, 1 view:   Mild to moderate degenerative changes in the lower lumbar spine. Mild degenerative changes in the SI joints and pubic symphysis. Mild left and moderate right hip joint osteoarthrosis. There is no evidence of fracture or osteonecrosis.as per radiology.     XR Knee left 3 Views:   1. Left total knee arthroplasty. Excellent position and alignment of components. No evidence of complication.   2. Extensive calcification in the region of the distal rectus femoris muscle/tendon which may be related to myositis ossificans. No acute pathology evident. as per radiology.     US Lower Extremity Venous Duplex, left:  No deep venous thrombosis in the left lower extremity. As per radiology.     Laboratory:  Laboratory findings were communicated with the patient who voiced  understanding of the findings.    1528 Glucose by meter: 112 (H)    Emergency Department Course:  Past medical records, nursing notes, and vitals reviewed.    1540 I physically examined the patient as documented above.     The patient was sent for radiographs while in the emergency department, results above.     1805 I rechecked the patient and discussed the findings of their workup thus far.     Findings and plan explained to the Patient. Patient discharged home with instructions regarding supportive care, medications, and reasons to return. The importance of close follow-up was reviewed.     I personally reviewed the imaging results with the Patient and answered all related questions prior to discharge.     Impression & Plan   Medical Decision Making:  The patient is an elderly female who comes in with left knee and left quadriceps pain which started this morning when she woke up.  She states that when she turned over in bed she felt pain in the left leg area as mentioned above and noted a bruise.  She is able to ambulate but says it feels best when she ambulates on her toes on that foot and just partial weightbearing.  X-rays show good placement of her prosthesis and arthritic changes in the left hip.  Ultrasounds negative for DVT.  I am recommending conservative management at this time.  Although she cannot recall an injury and she is concerned about the bruising we did discuss that many times people do bump things and developed bruising without realizing it and she may not ever recall an exact incident.  I think at this point crutches for partial weightbearing as tolerated and follow[facility]up with [facility] before the weekend.  In the meantime Tylenol as needed for discomfort ice the affected extremity, and elevate at nighttime.  Also 1 week out from now if she is continuing to be symptomatic behind the knee the should repeat an ultrasound as often early on its negative.  She understands the need for close  follow-up as discussed.    Diagnosis:    ICD-10-CM    1. Pain of left lower leg  M79.662      Disposition:  Discharged to home.    Discharge Medications:  New Prescriptions    No medications on file       Scribe Disclosure:  I, Aquilino Walters, am serving as a scribe at 3:46 PM on 11/4/2020 to document services personally performed by Min Riley MD based on my observations and the provider's statements to me.      Min Riley MD  11/04/20 1929

## 2020-11-05 NOTE — DISCHARGE INSTRUCTIONS
Crutches for partial weight-bearing as tolerated.  Elevate and ice over next 24-48 hours.  Tylenol 650 mg every 6-8 hours as needed for pain.  Follow up with [facility] on Friday.

## 2023-03-25 ENCOUNTER — HOSPITAL ENCOUNTER (EMERGENCY)
Facility: CLINIC | Age: 73
Discharge: HOME | End: 2023-03-25
Attending: EMERGENCY MEDICINE | Admitting: EMERGENCY MEDICINE
Payer: COMMERCIAL

## 2023-03-25 VITALS
RESPIRATION RATE: 16 BRPM | TEMPERATURE: 97.6 F | SYSTOLIC BLOOD PRESSURE: 137 MMHG | HEART RATE: 66 BPM | DIASTOLIC BLOOD PRESSURE: 87 MMHG | OXYGEN SATURATION: 93 %

## 2023-03-25 DIAGNOSIS — M79.10 MYALGIA: ICD-10-CM

## 2023-03-25 LAB
ANION GAP SERPL CALCULATED.3IONS-SCNC: 12 MMOL/L (ref 7–15)
BASOPHILS # BLD AUTO: 0 10E3/UL (ref 0–0.2)
BASOPHILS NFR BLD AUTO: 0 %
BUN SERPL-MCNC: 13.9 MG/DL (ref 8–23)
CALCIUM SERPL-MCNC: 9.3 MG/DL (ref 8.8–10.2)
CHLORIDE SERPL-SCNC: 102 MMOL/L (ref 98–107)
CK SERPL-CCNC: 90 U/L (ref 26–192)
CREAT SERPL-MCNC: 0.74 MG/DL (ref 0.51–0.95)
DEPRECATED HCO3 PLAS-SCNC: 26 MMOL/L (ref 22–29)
EOSINOPHIL # BLD AUTO: 0.1 10E3/UL (ref 0–0.7)
EOSINOPHIL NFR BLD AUTO: 1 %
ERYTHROCYTE [DISTWIDTH] IN BLOOD BY AUTOMATED COUNT: 13.1 % (ref 10–15)
GFR SERPL CREATININE-BSD FRML MDRD: 85 ML/MIN/1.73M2
GLUCOSE SERPL-MCNC: 95 MG/DL (ref 70–99)
HCT VFR BLD AUTO: 41.6 % (ref 35–47)
HGB BLD-MCNC: 13.3 G/DL (ref 11.7–15.7)
HOLD SPECIMEN: NORMAL
HOLD SPECIMEN: NORMAL
IMM GRANULOCYTES # BLD: 0 10E3/UL
IMM GRANULOCYTES NFR BLD: 0 %
LYMPHOCYTES # BLD AUTO: 1.8 10E3/UL (ref 0.8–5.3)
LYMPHOCYTES NFR BLD AUTO: 27 %
MAGNESIUM SERPL-MCNC: 2 MG/DL (ref 1.7–2.3)
MCH RBC QN AUTO: 28.7 PG (ref 26.5–33)
MCHC RBC AUTO-ENTMCNC: 32 G/DL (ref 31.5–36.5)
MCV RBC AUTO: 90 FL (ref 78–100)
MONOCYTES # BLD AUTO: 0.6 10E3/UL (ref 0–1.3)
MONOCYTES NFR BLD AUTO: 9 %
NEUTROPHILS # BLD AUTO: 4.2 10E3/UL (ref 1.6–8.3)
NEUTROPHILS NFR BLD AUTO: 63 %
NRBC # BLD AUTO: 0 10E3/UL
NRBC BLD AUTO-RTO: 0 /100
PLATELET # BLD AUTO: 287 10E3/UL (ref 150–450)
POTASSIUM SERPL-SCNC: 3.9 MMOL/L (ref 3.4–5.3)
RBC # BLD AUTO: 4.63 10E6/UL (ref 3.8–5.2)
SODIUM SERPL-SCNC: 140 MMOL/L (ref 136–145)
TROPONIN T SERPL HS-MCNC: 9 NG/L
WBC # BLD AUTO: 6.7 10E3/UL (ref 4–11)

## 2023-03-25 PROCEDURE — 83735 ASSAY OF MAGNESIUM: CPT | Performed by: EMERGENCY MEDICINE

## 2023-03-25 PROCEDURE — 82550 ASSAY OF CK (CPK): CPT | Performed by: EMERGENCY MEDICINE

## 2023-03-25 PROCEDURE — 85025 COMPLETE CBC W/AUTO DIFF WBC: CPT | Performed by: EMERGENCY MEDICINE

## 2023-03-25 PROCEDURE — 80048 BASIC METABOLIC PNL TOTAL CA: CPT | Performed by: EMERGENCY MEDICINE

## 2023-03-25 PROCEDURE — 36415 COLL VENOUS BLD VENIPUNCTURE: CPT | Performed by: EMERGENCY MEDICINE

## 2023-03-25 PROCEDURE — 84484 ASSAY OF TROPONIN QUANT: CPT | Performed by: EMERGENCY MEDICINE

## 2023-03-25 PROCEDURE — 99284 EMERGENCY DEPT VISIT MOD MDM: CPT

## 2023-03-25 PROCEDURE — 93005 ELECTROCARDIOGRAM TRACING: CPT

## 2023-03-25 ASSESSMENT — ENCOUNTER SYMPTOMS
NECK PAIN: 1
WEAKNESS: 0
NUMBNESS: 1
HEADACHES: 1
MYALGIAS: 1

## 2023-03-25 NOTE — ED PROVIDER NOTES
"  History     Chief Complaint:  Myriad complaints     HPI   Ghada Dillon is a 72 year old female with a history of asthma who presents with a myriad of complaints. Patient reports the onset of her symptoms started a couple of months ago. She reports pain in both her arms that travel up her neck and down to her legs. She reports the pain in her arm to have felt like she was a \"punching bag\". Patient also reports waking up in the mornings feeling like her face was \"twisted-up\" or \"sucked in\". She also states having intermittent neck pain, headaches and shortness of breath though none currently and no exacerbating symptoms. Patient reports taking Aleve and Ibuprofen for the pain which occasionally helps. Denies chest pain, nausea, vomiting, abdominal pain, vision change, focal weakness, alcohol or drug use.  No new medication changes. She presents today as she is tired of her symptoms.       Independent Historian:   Patient     Review of External Notes: I reviewed the note from 2/15/23.    ROS:  Review of Systems   Musculoskeletal: Positive for myalgias and neck pain.   Neurological: Positive for numbness and headaches. Negative for weakness.   All other systems reviewed and are negative.      Allergies:  No Known Allergies     Medications:    Proair  Deltasone  Albuterol  Flexeril  Flonase    Past Medical History:    Asthma  Chronic left shoulder pain  Anogenital Pruritus    Past Surgical History:    Past Surgical History:   Procedure Laterality Date     EYE SURGERY          Family History:    family history is not on file.    Social History:   reports that she has quit smoking. She has never used smokeless tobacco. She reports that she does not drink alcohol and does not use drugs.  PCP: Deborah Elliott     Physical Exam     Patient Vitals for the past 24 hrs:   BP Temp Temp src Pulse Resp SpO2   03/25/23 1257 (!) 145/85 97.6  F (36.4  C) Temporal 67 20 97 %        Physical Exam  General: " Well-nourished, nontoxic  Eyes: PERRL, EOMI, no nystagmus.  No scleral icterus or conjunctival injection.    ENT:  Moist mucus membranes, posterior oropharynx clear without erythema or exudates. TM normal bilaterally  Neck: Supple with full range of motion  Respiratory:  Lungs clear to auscultation bilaterally, no crackles/rubs/wheezes.  Good air movement  CV: Normal rate and rhythm, no murmurs/rubs/gallops  GI:  Abdomen soft and non-distended.  No tenderness, guarding or rebound  Skin: Warm, dry.  No rashes or petechiae  MSK: No peripheral edema or calf tenderness  Neuro: Alert and oriented to person/place/time.  No aphasia/facial droop/dysarthria.  Tongue midline, normal strength at SCM/trapezius/BUE/BLE.  Normal finger to nose. Normal gait. Sensation intact over face/BUE/BLE  Psychiatric: Normal affect    Emergency Department Course   ECG  ECG taken at 1349, ECG read at 1349  Normal sinus rhythm  Left axis deviation  Abnormal ECH   Rate 66 bpm. IL interval 192 ms. QRS duration 86 ms. QT/QTc 424/444 ms. P-R-T axes 48 -33 20.       Laboratory:  Labs Ordered and Resulted from Time of ED Arrival to Time of ED Departure   BASIC METABOLIC PANEL - Normal       Result Value    Sodium 140      Potassium 3.9      Chloride 102      Carbon Dioxide (CO2) 26      Anion Gap 12      Urea Nitrogen 13.9      Creatinine 0.74      Calcium 9.3      Glucose 95      GFR Estimate 85     TROPONIN T, HIGH SENSITIVITY - Normal    Troponin T, High Sensitivity 9     MAGNESIUM - Normal    Magnesium 2.0     CK TOTAL - Normal    CK 90     CBC WITH PLATELETS AND DIFFERENTIAL    WBC Count 6.7      RBC Count 4.63      Hemoglobin 13.3      Hematocrit 41.6      MCV 90      MCH 28.7      MCHC 32.0      RDW 13.1      Platelet Count 287      % Neutrophils 63      % Lymphocytes 27      % Monocytes 9      % Eosinophils 1      % Basophils 0      % Immature Granulocytes 0      NRBCs per 100 WBC 0      Absolute Neutrophils 4.2      Absolute Lymphocytes 1.8       Absolute Monocytes 0.6      Absolute Eosinophils 0.1      Absolute Basophils 0.0      Absolute Immature Granulocytes 0.0      Absolute NRBCs 0.0          Emergency Department Course & Assessments:    Interventions:  Medications - No data to display     Consultations/Discussion of Management or Tests:    ED Course as of 03/25/23 1416   Sat Mar 25, 2023   1314 I obtained history and examined the patient as noted above.     1416 I rechecked the patient and explained findings.        Social Determinants of Health affecting care:   None    Disposition:  The patient was discharged to home.     Impression & Plan      Medical Decision Making:  Patient is a 72-year-old female presenting with a myriad of complaints predominantly diffuse myalgias.  She also notes dyspnea though denies any current dyspnea at bedside.  She is nontoxic, well-hydrated and in no significant distress.  EKG without focal ischemia or underlying arrhythmia.  High-sensitivity screening troponin negative, she denies any active chest pain.  Labs without profound anemia or significant electrolyte derangements.  Patient did note occasional headaches though is neurologically intact. I did offer formal CT imaging to exclude underlying mass though she is wishing to defer at this point in time.  We also discussed consideration of chest x-ray though she is wishing to defer this at this point in time 2.  I doubt serious cardiopulmonary etiology.  Patient expressed understanding of missed or delayed diagnoses.  She feels comfortable with plans for close outpatient PCP follow-up.  We discussed return precautions.  All questions addressed.    Diagnosis:    ICD-10-CM    1. Myalgia  M79.10            Discharge Medications:  Discharge Medication List as of 3/25/2023  2:21 PM           3/25/2023   Salome Spear, Salome Cruz,   03/25/23 1536

## 2023-03-25 NOTE — ED TRIAGE NOTES
"Pt arrives with c/o chest pain that she woke up with, pt took 4 baby aspirin at 10am. Pt also reports that she woke up and her cheeks were \"sunken in\" and that has never happened before. Pt reports she had some SOB earlier but currently denies.      Triage Assessment     Row Name 03/25/23 3567       Triage Assessment (Adult)    Airway WDL WDL       Respiratory WDL    Respiratory WDL X;rhythm/pattern    Rhythm/Pattern, Respiratory shortness of breath       Skin Circulation/Temperature WDL    Skin Circulation/Temperature WDL WDL       Cardiac WDL    Cardiac WDL X;chest pain       Chest Pain Assessment    Chest Pain Location anterior chest, right       Peripheral/Neurovascular WDL    Peripheral Neurovascular WDL WDL       Cognitive/Neuro/Behavioral WDL    Cognitive/Neuro/Behavioral WDL WDL              "

## 2023-03-27 LAB
ATRIAL RATE - MUSE: 66 BPM
DIASTOLIC BLOOD PRESSURE - MUSE: NORMAL MMHG
INTERPRETATION ECG - MUSE: NORMAL
P AXIS - MUSE: 48 DEGREES
PR INTERVAL - MUSE: 192 MS
QRS DURATION - MUSE: 86 MS
QT - MUSE: 424 MS
QTC - MUSE: 444 MS
R AXIS - MUSE: -33 DEGREES
SYSTOLIC BLOOD PRESSURE - MUSE: NORMAL MMHG
T AXIS - MUSE: 20 DEGREES
VENTRICULAR RATE- MUSE: 66 BPM

## 2023-09-09 ENCOUNTER — APPOINTMENT (OUTPATIENT)
Dept: GENERAL RADIOLOGY | Facility: CLINIC | Age: 73
End: 2023-09-09
Attending: EMERGENCY MEDICINE
Payer: COMMERCIAL

## 2023-09-09 ENCOUNTER — HOSPITAL ENCOUNTER (EMERGENCY)
Facility: CLINIC | Age: 73
Discharge: HOME | End: 2023-09-09
Attending: EMERGENCY MEDICINE | Admitting: EMERGENCY MEDICINE
Payer: COMMERCIAL

## 2023-09-09 VITALS
WEIGHT: 215.17 LBS | OXYGEN SATURATION: 100 % | HEART RATE: 60 BPM | RESPIRATION RATE: 18 BRPM | SYSTOLIC BLOOD PRESSURE: 154 MMHG | DIASTOLIC BLOOD PRESSURE: 105 MMHG | TEMPERATURE: 98.1 F | BODY MASS INDEX: 35.81 KG/M2

## 2023-09-09 DIAGNOSIS — R07.89 ATYPICAL CHEST PAIN: ICD-10-CM

## 2023-09-09 DIAGNOSIS — M25.512 CHRONIC PAIN OF BOTH SHOULDERS: ICD-10-CM

## 2023-09-09 DIAGNOSIS — M25.511 CHRONIC PAIN OF BOTH SHOULDERS: ICD-10-CM

## 2023-09-09 DIAGNOSIS — G89.29 CHRONIC PAIN OF BOTH SHOULDERS: ICD-10-CM

## 2023-09-09 LAB
ANION GAP SERPL CALCULATED.3IONS-SCNC: 9 MMOL/L (ref 7–15)
BASOPHILS # BLD AUTO: 0 10E3/UL (ref 0–0.2)
BASOPHILS NFR BLD AUTO: 1 %
BUN SERPL-MCNC: 17.8 MG/DL (ref 8–23)
CALCIUM SERPL-MCNC: 9.1 MG/DL (ref 8.8–10.2)
CHLORIDE SERPL-SCNC: 102 MMOL/L (ref 98–107)
COHGB MFR BLD: 1.3 % (ref 0–2)
CREAT SERPL-MCNC: 0.74 MG/DL (ref 0.51–0.95)
DEPRECATED HCO3 PLAS-SCNC: 29 MMOL/L (ref 22–29)
EGFRCR SERPLBLD CKD-EPI 2021: 85 ML/MIN/1.73M2
EOSINOPHIL # BLD AUTO: 0.2 10E3/UL (ref 0–0.7)
EOSINOPHIL NFR BLD AUTO: 5 %
ERYTHROCYTE [DISTWIDTH] IN BLOOD BY AUTOMATED COUNT: 13.3 % (ref 10–15)
GLUCOSE SERPL-MCNC: 88 MG/DL (ref 70–99)
HCT VFR BLD AUTO: 39.8 % (ref 35–47)
HGB BLD-MCNC: 12.4 G/DL (ref 11.7–15.7)
HOLD SPECIMEN: NORMAL
HOLD SPECIMEN: NORMAL
IMM GRANULOCYTES # BLD: 0 10E3/UL
IMM GRANULOCYTES NFR BLD: 0 %
LYMPHOCYTES # BLD AUTO: 2 10E3/UL (ref 0.8–5.3)
LYMPHOCYTES NFR BLD AUTO: 47 %
MCH RBC QN AUTO: 28 PG (ref 26.5–33)
MCHC RBC AUTO-ENTMCNC: 31.2 G/DL (ref 31.5–36.5)
MCV RBC AUTO: 90 FL (ref 78–100)
MONOCYTES # BLD AUTO: 0.3 10E3/UL (ref 0–1.3)
MONOCYTES NFR BLD AUTO: 7 %
NEUTROPHILS # BLD AUTO: 1.7 10E3/UL (ref 1.6–8.3)
NEUTROPHILS NFR BLD AUTO: 40 %
NRBC # BLD AUTO: 0 10E3/UL
NRBC BLD AUTO-RTO: 0 /100
PLATELET # BLD AUTO: 352 10E3/UL (ref 150–450)
POTASSIUM SERPL-SCNC: 4.2 MMOL/L (ref 3.4–5.3)
RBC # BLD AUTO: 4.43 10E6/UL (ref 3.8–5.2)
SODIUM SERPL-SCNC: 140 MMOL/L (ref 136–145)
TROPONIN T SERPL HS-MCNC: 8 NG/L
TROPONIN T SERPL HS-MCNC: 9 NG/L
WBC # BLD AUTO: 4.3 10E3/UL (ref 4–11)

## 2023-09-09 PROCEDURE — 84484 ASSAY OF TROPONIN QUANT: CPT | Performed by: EMERGENCY MEDICINE

## 2023-09-09 PROCEDURE — 36415 COLL VENOUS BLD VENIPUNCTURE: CPT | Performed by: EMERGENCY MEDICINE

## 2023-09-09 PROCEDURE — 250N000013 HC RX MED GY IP 250 OP 250 PS 637: Performed by: EMERGENCY MEDICINE

## 2023-09-09 PROCEDURE — 82375 ASSAY CARBOXYHB QUANT: CPT | Performed by: EMERGENCY MEDICINE

## 2023-09-09 PROCEDURE — 85025 COMPLETE CBC W/AUTO DIFF WBC: CPT | Performed by: EMERGENCY MEDICINE

## 2023-09-09 PROCEDURE — 71046 X-RAY EXAM CHEST 2 VIEWS: CPT

## 2023-09-09 PROCEDURE — 93005 ELECTROCARDIOGRAM TRACING: CPT

## 2023-09-09 PROCEDURE — 80048 BASIC METABOLIC PNL TOTAL CA: CPT | Performed by: EMERGENCY MEDICINE

## 2023-09-09 PROCEDURE — 99285 EMERGENCY DEPT VISIT HI MDM: CPT | Mod: 25

## 2023-09-09 RX ORDER — ACETAMINOPHEN 500 MG
1000 TABLET ORAL EVERY 4 HOURS PRN
Status: DISCONTINUED | OUTPATIENT
Start: 2023-09-09 | End: 2023-09-09 | Stop reason: HOSPADM

## 2023-09-09 RX ORDER — CYCLOBENZAPRINE HCL 10 MG
10 TABLET ORAL 3 TIMES DAILY PRN
Qty: 15 TABLET | Refills: 0 | Status: SHIPPED | OUTPATIENT
Start: 2023-09-09 | End: 2023-09-10

## 2023-09-09 RX ORDER — CYCLOBENZAPRINE HCL 10 MG
10 TABLET ORAL ONCE
Status: COMPLETED | OUTPATIENT
Start: 2023-09-09 | End: 2023-09-09

## 2023-09-09 RX ADMIN — ACETAMINOPHEN 1000 MG: 500 TABLET, FILM COATED ORAL at 19:18

## 2023-09-09 RX ADMIN — CYCLOBENZAPRINE HYDROCHLORIDE 10 MG: 10 TABLET, FILM COATED ORAL at 19:53

## 2023-09-09 ASSESSMENT — ACTIVITIES OF DAILY LIVING (ADL): ADLS_ACUITY_SCORE: 35

## 2023-09-09 NOTE — ED TRIAGE NOTES
Pt presents to the ED with complaint of crushing chest pain 10/10 starting at 0600. Pt states she took 4 81mg aspirin at that time, and then took 4 more 81mg aspirin at 1400. She states her right arm hurts bad today and thinks it's related to the chest pain. She has had intermittent right arm pain for months. Pt also thinks she could have carbon monoxide poisoning r/t her apartment being under the garage.      Triage Assessment       Row Name 09/09/23 1622       Triage Assessment (Adult)    Airway WDL WDL       Respiratory WDL    Respiratory WDL WDL       Skin Circulation/Temperature WDL    Skin Circulation/Temperature WDL WDL       Cardiac WDL    Cardiac WDL X;rhythm    Pulse Rate & Regularity bradycardic       Peripheral/Neurovascular WDL    Peripheral Neurovascular WDL WDL       Cognitive/Neuro/Behavioral WDL    Cognitive/Neuro/Behavioral WDL WDL

## 2023-09-10 RX ORDER — CYCLOBENZAPRINE HCL 10 MG
10 TABLET ORAL 3 TIMES DAILY PRN
Qty: 15 TABLET | Refills: 0 | Status: SHIPPED | OUTPATIENT
Start: 2023-09-10

## 2023-09-10 NOTE — ED PROVIDER NOTES
History     Chief Complaint:  Arm Pain       HPI   Ghada Dillon is a 73 year old female who presents emerged department with continued shoulder pain bilaterally as well as chest pain.  This been bothering her for well over a month.  She was seen at [facility] on August 18 and had x-rays done and she was diagnosed with frozen shoulder.  She has yet to follow-up with physical therapy.  The chest pain she is having has been relatively constant for the past month and it is rather atypical in nature.  She denies any shortness of breath, nausea or diaphoresis.  No history of acute coronary syndrome or PE.  He denies pain with inspiration or recent travel.  She has been taking Tylenol which does seem to help somewhat with the pain.  No infectious symptoms.      Independent Historian:    The patient    Review of External Notes:  I reviewed her ED visit from [facility] on 819.    Medications:    cyclobenzaprine (FLEXERIL) 10 MG tablet  albuterol (2.5 MG/3ML) 0.083% neb solution  albuterol (PROAIR HFA) 108 (90 BASE) MCG/ACT Inhaler  ALBUTEROL IN  conjugated estrogens (PREMARIN) vaginal cream  hydrocortisone (ANUSOL-HC) 2.5 % cream  latanoprost (XALATAN) 0.005 % ophthalmic solution  predniSONE (DELTASONE) 20 MG tablet  predniSONE (DELTASONE) 20 MG tablet        Past Medical History:    Past Medical History:   Diagnosis Date    Uncomplicated asthma        Past Surgical History:    Past Surgical History:   Procedure Laterality Date    EYE SURGERY            Physical Exam   Patient Vitals for the past 24 hrs:   BP Temp Temp src Pulse Resp SpO2 Weight   09/09/23 1621 (!) 149/87 98.1  F (36.7  C) Oral 56 18 95 % 97.6 kg (215 lb 2.7 oz)        Physical Exam  Constitutional: Vital signs reviewed as above  General: Alert, pleasant  HEENT: Moist mucous membranes  Eyes: Pupils are equal, round, and reactive to light.   Neck: Normal range of motion  Cardiovascular: normal rate, Regular rhythm and normal heart sounds.  No  MRG  Pulmonary/Chest: Effort normal and breath sounds normal. No respiratory distress. Patient has no wheezes. Patient has no rales.  No chest wall tenderness.  Gastrointestinal: Soft. Positive bowel sounds. No MRG.  Musculoskeletal/Extremities: Full ROM.  Tender over the bilateral shoulders.  Limited range of motion secondary to pain.  Normal strength and sensation distally.  Endo: No pitting edema  Neurological: Alert, no focal deficits.  Skin: Skin is warm and dry.   Psychiatric: Pleasant      Emergency Department Course   ECG  Sinus bradycardia, rate of 56, no acute concerning ST or T wave changes.    Imaging:  Chest XR,  PA & LAT   Final Result   IMPRESSION:       No focal airspace disease. No pleural effusion or pneumothorax.      The cardiomediastinal silhouette is unremarkable. Mild aortic calcifications.        Report per radiology    Laboratory:  Labs Ordered and Resulted from Time of ED Arrival to Time of ED Departure   CBC WITH PLATELETS AND DIFFERENTIAL - Abnormal       Result Value    WBC Count 4.3      RBC Count 4.43      Hemoglobin 12.4      Hematocrit 39.8      MCV 90      MCH 28.0      MCHC 31.2 (*)     RDW 13.3      Platelet Count 352      % Neutrophils 40      % Lymphocytes 47      % Monocytes 7      % Eosinophils 5      % Basophils 1      % Immature Granulocytes 0      NRBCs per 100 WBC 0      Absolute Neutrophils 1.7      Absolute Lymphocytes 2.0      Absolute Monocytes 0.3      Absolute Eosinophils 0.2      Absolute Basophils 0.0      Absolute Immature Granulocytes 0.0      Absolute NRBCs 0.0     BASIC METABOLIC PANEL - Normal    Sodium 140      Potassium 4.2      Chloride 102      Carbon Dioxide (CO2) 29      Anion Gap 9      Urea Nitrogen 17.8      Creatinine 0.74      Calcium 9.1      Glucose 88      GFR Estimate 85     TROPONIN T, HIGH SENSITIVITY - Normal    Troponin T, High Sensitivity 8     CARBON MONOXIDE - Normal    Carbon Monoxide 1.3     TROPONIN T, HIGH SENSITIVITY - Normal     Troponin T, High Sensitivity 9            Emergency Department Course & Assessments:      Interventions:  Medications   acetaminophen (TYLENOL) tablet 1,000 mg (1,000 mg Oral $Given 9/9/23 1918)   cyclobenzaprine (FLEXERIL) tablet 10 mg (10 mg Oral $Given 9/9/23 1953)        Independent Interpretation (X-rays, CTs, rhythm strip):  Chest x-ray negative for any acute process per my interpretation.      Social Determinants of Health affecting care:  None     Disposition:  The patient was discharged to home.     Impression & Plan        Medical Decision Making:  Patient presents emergency department with concerns over continued chest pain which is bothering her for couple of months now.  Her EKG showed no signs of ischemia and she has 2 negative troponins making acute course and unlikely.  Symptoms or not consistent with PE so that was not pursued.  She also has shoulder pain.  She was seen for this at [facility] 3 weeks ago.  She was diagnosed with frozen shoulder and recommended to do physical therapy.  She is yet to establish this.  I did give her a dose of Flexeril here as well as some Tylenol and she is feeling better.  I do agree that she will benefit from physical therapy and I stressed importance of this.  She plans to make that call on Monday.  In the meantime we will give her a small amount of Flexeril that she can use at home for pain.  Continue with Tylenol as well.  She is also concerned about the possibility of carbon monoxide poisoning as she lives right above an underground garage.  She has been having intermittent headaches.  Her carbadox level did come back normal.  At this point she will be discharged to home.  Both she and her daughter were comfortable with the plan.      Diagnosis:    ICD-10-CM    1. Chronic pain of both shoulders  M25.511     G89.29     M25.512       2. Atypical chest pain  R07.89            Discharge Medications:  New Prescriptions    CYCLOBENZAPRINE (FLEXERIL) 10 MG TABLET     Take 1 tablet (10 mg) by mouth 3 times daily as needed for muscle spasms          Israel Triplett MD  9/9/2023   Israel Triplett MD Walters, Brent Aaron, MD  09/09/23 4431

## 2023-09-11 LAB
ATRIAL RATE - MUSE: 56 BPM
DIASTOLIC BLOOD PRESSURE - MUSE: NORMAL MMHG
INTERPRETATION ECG - MUSE: NORMAL
P AXIS - MUSE: 41 DEGREES
PR INTERVAL - MUSE: 176 MS
QRS DURATION - MUSE: 88 MS
QT - MUSE: 456 MS
QTC - MUSE: 440 MS
R AXIS - MUSE: -28 DEGREES
SYSTOLIC BLOOD PRESSURE - MUSE: NORMAL MMHG
T AXIS - MUSE: 17 DEGREES
VENTRICULAR RATE- MUSE: 56 BPM

## 2023-09-24 ENCOUNTER — APPOINTMENT (OUTPATIENT)
Dept: ULTRASOUND IMAGING | Facility: CLINIC | Age: 73
End: 2023-09-24
Attending: EMERGENCY MEDICINE
Payer: COMMERCIAL

## 2023-09-24 ENCOUNTER — HOSPITAL ENCOUNTER (EMERGENCY)
Facility: CLINIC | Age: 73
Discharge: HOME | End: 2023-09-24
Payer: COMMERCIAL

## 2023-09-24 ENCOUNTER — APPOINTMENT (OUTPATIENT)
Dept: MRI IMAGING | Facility: CLINIC | Age: 73
End: 2023-09-24
Payer: COMMERCIAL

## 2023-09-24 VITALS
DIASTOLIC BLOOD PRESSURE: 76 MMHG | BODY MASS INDEX: 30.99 KG/M2 | RESPIRATION RATE: 16 BRPM | OXYGEN SATURATION: 99 % | SYSTOLIC BLOOD PRESSURE: 154 MMHG | TEMPERATURE: 98 F | HEIGHT: 65 IN | HEART RATE: 64 BPM | WEIGHT: 186 LBS

## 2023-09-24 DIAGNOSIS — M48.02 CERVICAL STENOSIS OF SPINAL CANAL: ICD-10-CM

## 2023-09-24 DIAGNOSIS — M19.90 INFLAMMATORY ARTHROPATHY: ICD-10-CM

## 2023-09-24 DIAGNOSIS — M54.12 CERVICAL RADICULOPATHY: Primary | ICD-10-CM

## 2023-09-24 DIAGNOSIS — E04.1 THYROID NODULE: ICD-10-CM

## 2023-09-24 LAB
ANION GAP SERPL CALCULATED.3IONS-SCNC: 10 MMOL/L (ref 7–15)
BASOPHILS # BLD AUTO: 0 10E3/UL (ref 0–0.2)
BASOPHILS NFR BLD AUTO: 1 %
BUN SERPL-MCNC: 13.1 MG/DL (ref 8–23)
CALCIUM SERPL-MCNC: 9.4 MG/DL (ref 8.8–10.2)
CHLORIDE SERPL-SCNC: 100 MMOL/L (ref 98–107)
CREAT SERPL-MCNC: 0.74 MG/DL (ref 0.51–0.95)
DEPRECATED HCO3 PLAS-SCNC: 28 MMOL/L (ref 22–29)
EGFRCR SERPLBLD CKD-EPI 2021: 85 ML/MIN/1.73M2
EOSINOPHIL # BLD AUTO: 0.2 10E3/UL (ref 0–0.7)
EOSINOPHIL NFR BLD AUTO: 5 %
ERYTHROCYTE [DISTWIDTH] IN BLOOD BY AUTOMATED COUNT: 13.1 % (ref 10–15)
GLUCOSE SERPL-MCNC: 101 MG/DL (ref 70–99)
HCT VFR BLD AUTO: 39.2 % (ref 35–47)
HGB BLD-MCNC: 12.4 G/DL (ref 11.7–15.7)
IMM GRANULOCYTES # BLD: 0 10E3/UL
IMM GRANULOCYTES NFR BLD: 0 %
LYMPHOCYTES # BLD AUTO: 1.7 10E3/UL (ref 0.8–5.3)
LYMPHOCYTES NFR BLD AUTO: 42 %
MCH RBC QN AUTO: 28.4 PG (ref 26.5–33)
MCHC RBC AUTO-ENTMCNC: 31.6 G/DL (ref 31.5–36.5)
MCV RBC AUTO: 90 FL (ref 78–100)
MONOCYTES # BLD AUTO: 0.4 10E3/UL (ref 0–1.3)
MONOCYTES NFR BLD AUTO: 10 %
NEUTROPHILS # BLD AUTO: 1.7 10E3/UL (ref 1.6–8.3)
NEUTROPHILS NFR BLD AUTO: 42 %
NRBC # BLD AUTO: 0 10E3/UL
NRBC BLD AUTO-RTO: 0 /100
PLATELET # BLD AUTO: 319 10E3/UL (ref 150–450)
POTASSIUM SERPL-SCNC: 4.6 MMOL/L (ref 3.4–5.3)
RBC # BLD AUTO: 4.37 10E6/UL (ref 3.8–5.2)
SODIUM SERPL-SCNC: 138 MMOL/L (ref 136–145)
WBC # BLD AUTO: 4.1 10E3/UL (ref 4–11)

## 2023-09-24 PROCEDURE — 250N000013 HC RX MED GY IP 250 OP 250 PS 637

## 2023-09-24 PROCEDURE — 72141 MRI NECK SPINE W/O DYE: CPT

## 2023-09-24 PROCEDURE — 82310 ASSAY OF CALCIUM: CPT | Performed by: EMERGENCY MEDICINE

## 2023-09-24 PROCEDURE — 250N000011 HC RX IP 250 OP 636: Mod: JZ

## 2023-09-24 PROCEDURE — 36415 COLL VENOUS BLD VENIPUNCTURE: CPT | Performed by: EMERGENCY MEDICINE

## 2023-09-24 PROCEDURE — 96374 THER/PROPH/DIAG INJ IV PUSH: CPT

## 2023-09-24 PROCEDURE — 93971 EXTREMITY STUDY: CPT | Mod: RT

## 2023-09-24 PROCEDURE — 250N000013 HC RX MED GY IP 250 OP 250 PS 637: Performed by: EMERGENCY MEDICINE

## 2023-09-24 PROCEDURE — 99285 EMERGENCY DEPT VISIT HI MDM: CPT | Mod: 25

## 2023-09-24 PROCEDURE — 85025 COMPLETE CBC W/AUTO DIFF WBC: CPT | Performed by: EMERGENCY MEDICINE

## 2023-09-24 RX ORDER — METHYLPREDNISOLONE 4 MG
TABLET, DOSE PACK ORAL
Qty: 21 TABLET | Refills: 0 | Status: SHIPPED | OUTPATIENT
Start: 2023-09-24 | End: 2023-09-29

## 2023-09-24 RX ORDER — HYDROCODONE BITARTRATE AND ACETAMINOPHEN 5; 325 MG/1; MG/1
2 TABLET ORAL ONCE
Status: COMPLETED | OUTPATIENT
Start: 2023-09-24 | End: 2023-09-24

## 2023-09-24 RX ORDER — TIZANIDINE 2 MG/1
2 TABLET ORAL 3 TIMES DAILY PRN
Qty: 15 TABLET | Refills: 0 | Status: SHIPPED | OUTPATIENT
Start: 2023-09-24

## 2023-09-24 RX ORDER — DEXAMETHASONE SODIUM PHOSPHATE 10 MG/ML
10 INJECTION, SOLUTION INTRAMUSCULAR; INTRAVENOUS ONCE
Status: COMPLETED | OUTPATIENT
Start: 2023-09-24 | End: 2023-09-24

## 2023-09-24 RX ORDER — METHOCARBAMOL 500 MG/1
500 TABLET, FILM COATED ORAL 4 TIMES DAILY
Status: DISCONTINUED | OUTPATIENT
Start: 2023-09-24 | End: 2023-09-24 | Stop reason: HOSPADM

## 2023-09-24 RX ORDER — ACETAMINOPHEN 500 MG
500 TABLET ORAL EVERY 4 HOURS PRN
Status: DISCONTINUED | OUTPATIENT
Start: 2023-09-24 | End: 2023-09-24 | Stop reason: HOSPADM

## 2023-09-24 RX ADMIN — DEXAMETHASONE SODIUM PHOSPHATE 10 MG: 10 INJECTION, SOLUTION INTRAMUSCULAR; INTRAVENOUS at 12:25

## 2023-09-24 RX ADMIN — HYDROCODONE BITARTRATE AND ACETAMINOPHEN 2 TABLET: 5; 325 TABLET ORAL at 12:26

## 2023-09-24 RX ADMIN — ACETAMINOPHEN 500 MG: 500 TABLET, FILM COATED ORAL at 05:53

## 2023-09-24 RX ADMIN — METHOCARBAMOL 500 MG: 500 TABLET ORAL at 12:26

## 2023-09-24 ASSESSMENT — ACTIVITIES OF DAILY LIVING (ADL): ADLS_ACUITY_SCORE: 35

## 2023-09-24 NOTE — ED PROVIDER NOTES
History     Chief Complaint:  Shoulder Pain       HPI   Ghada Dillon is a 73 year old female history of gastric bypass who presents to the emergency room for evaluation of right-sided neck, shoulder and arm pain.  The patient states that this has been going on for quite some time.  She was seen at the [facility] emergency department 8/19 and had some x-rays done of her humerus and shoulder per below.  There was no trauma that provoked this pain and she was told her x-rays just showed degenerative changes.  She then came back to our emergency department 9/9 for shoulder pain again.  Had a cardiac rule out with EKG, troponin, CBC, BMP and chest x-ray and was discharged home.  Since then she states that she has right-sided neck discomfort.  Pain radiates into both shoulders but much worse on the right.  She states the pain is throbbing and goes down her entire arm.  She feels some numbness and tingling in the fingers on her right arm.  She has noticed that she is losing strength in the right upper extremity.  She feels that her shoulder is a bit swollen.  She denies any recent surgeries, long distance travel, hormone use, immobilizations or history of clot.  Patient has no prior neck surgeries.  She has been taking Tylenol and took 500 mg this morning which did not help much.  She cannot take ibuprofen because of her history of gastric bypass.  She had been referred to physical therapy from her [facility] visit, but has not been able to set up any physical therapy at this point.  Patient denies any chest pain, shortness of breath, headache, room spinning dizziness, unsteadiness, GI symptoms.      Independent Historian:    Patient only supplies the history    Review of External Notes:  I reviewed the note from 8/19 that states that she was seen at [facility] for shoulder pain. She had x rays done diagnosed with frozen shoulder.     Impression 9/09/23  XR Chest   No focal airspace disease. No pleural effusion or  pneumothorax.  The cardiomediastinal silhouette is unremarkable. Mild aortic calcifications.    Impression 8/19/23 XR Shoulder Rt AP/Y/Axillary   Humeral head is seated within the glenoid. Degenerative changes are identified at the chromic clavicular and glenohumeral joint spaces. No acute fracture.     Impression 8/19/23 XR Shoulder LT AP/Y/Axillary   Humeral head is seated within the glenoid. Degenerative changes are identified at the acromioclavicular and glenohumeral joint spaces. No acute fracture.    Impression 8/19/23 XR Humerus Rt 2 Views   Patient reports an episode yesterday of feeling her heart race followed by pain. Reports she took 4 baby aspirin at that time. Continues with chest pain today.     Impression 8/19/23 XR Humerus Lt 2 Views  Humeral head is seated within the glenoid. Degenerative changes are identified at the acromioclavicular and glenohumeral joint spaces. An IV catheter is identified at the antecubital space. No fracture.     Impression 8/19/23 XR Chest 1 View  Low lung volumes with bronchovascular crowding. No focal consolidation, pleural effusion or pneumothorax. Unremarkable cardiomediastinal silhouette. No displaced rib fracture.         Medications:    Flexeril   Zovirax     Past Medical History:    Asthma   GERD  Obesity   Depressive disorder  Dyspepsia   Migraine   Hyperlipidemia   Hyperopia   Amblyopia   Thumb pain   Inclusion cyst   Senile nuclear sclerosis   Mild intermittent asthma   Calculus of kidney   Diverticular disease     Past Surgical History:    Eye surgery   Gastric bypass       Physical Exam   Patient Vitals for the past 24 hrs:   BP Temp Temp src Pulse Resp SpO2 Height Weight   09/24/23 1200 (!) 154/76 -- -- 64 16 99 % -- --   09/24/23 1030 (!) 167/98 -- -- 60 -- -- -- --   09/24/23 1000 (!) 165/96 -- -- 61 -- -- -- --   09/24/23 0932 -- -- -- -- -- 100 % -- --   09/24/23 0930 (!) 164/106 -- -- 61 -- 100 % -- --   09/24/23 0929 -- -- -- -- -- 95 % -- --   09/24/23  "0928 -- -- -- -- -- 100 % -- --   09/24/23 0927 (!) 183/102 -- -- 67 -- 97 % -- --   09/24/23 0539 (!) 173/108 98  F (36.7  C) Temporal 72 18 97 % 1.651 m (5' 5\") 84.4 kg (186 lb)        Physical Exam  General: Nontoxic appearing adult female laying on gurney.  HENT:   Head: Atraumatic.  Mouth/Throat: Oropharynx clear and moist.  Eyes: Conjunctive and EOM normal. PERRLA.  Neck: Normal ROM. No rigidity.  No midline C-spine tenderness.  Patient has some tenderness over the right neck.  CV: Regular rate and rhythm. Normal S1, S2. No appreciable murmurs.  Resp: Lungs clear to auscultation bilaterally. Normal respiratory effort. No stridor or cough observed.  GI: Abdomen soft, non distended and nontender. No rebound or guarding.  MSK: Patient guarded with range of motion at all joints in the right arm.  She has a weak  strength in the right upper extremity as compared to the left.  Skin: Warm and dry.  Neuro: Awake, alert, oriented x 3.  Decreased sensation to light touch in the right upper extremity as compared to the left.  Psych: Normal mood and affect.      Emergency Department Course     Imaging:  Cervical spine MRI w/o contrast   Final Result   IMPRESSION:   1.  Multilevel cervical spondylosis.   2.  Moderate bilateral neural foraminal stenosis at C3-C4. Mild to moderate bilateral neural foraminal stenosis at C6-C7 and T1-T2.   3.  Mild spinal canal stenosis C3-C7.   4.  Active inflammatory arthropathy of the left C1-C2 articulation.   5.  Normal cervical spinal cord signal.   6.  Partially visualized left thyroid lobe nodule measuring up to 2.1 cm. Recommend thyroid ultrasound for further evaluation.      REFERENCE:   Dmitriy BENAVIDES et al. Managing Incidental Thyroid Nodules Detected on Imaging: White Paper of the ACR Incidental Thyroid Findings Committee. JACR 2015; 12:143-150.      Incidental thyroid nodule detected on CT or MRI without suspicious findings. Applies to general population without limited life " expectancy or significant comorbidities.      Age greater than or equal to 35 years   Less than 1.5 cm: No further evaluation.   Greater than or equal to 1.5 cm: Evaluate with thyroid ultrasound.            US Upper Extremity Venous Duplex Right   Final Result   IMPRESSION:   1.  No deep venous thrombosis in the right upper extremity.        Report per radiology    Laboratory:  Labs Ordered and Resulted from Time of ED Arrival to Time of ED Departure   BASIC METABOLIC PANEL - Abnormal       Result Value    Sodium 138      Potassium 4.6      Chloride 100      Carbon Dioxide (CO2) 28      Anion Gap 10      Urea Nitrogen 13.1      Creatinine 0.74      Calcium 9.4      Glucose 101 (*)     GFR Estimate 85     CBC WITH PLATELETS AND DIFFERENTIAL    WBC Count 4.1      RBC Count 4.37      Hemoglobin 12.4      Hematocrit 39.2      MCV 90      MCH 28.4      MCHC 31.6      RDW 13.1      Platelet Count 319      % Neutrophils 42      % Lymphocytes 42      % Monocytes 10      % Eosinophils 5      % Basophils 1      % Immature Granulocytes 0      NRBCs per 100 WBC 0      Absolute Neutrophils 1.7      Absolute Lymphocytes 1.7      Absolute Monocytes 0.4      Absolute Eosinophils 0.2      Absolute Basophils 0.0      Absolute Immature Granulocytes 0.0      Absolute NRBCs 0.0          Emergency Department Course & Assessments:      Interventions:  Medications   acetaminophen (TYLENOL) tablet 500 mg (500 mg Oral $Given 9/24/23 3906)   methocarbamol (ROBAXIN) tablet 500 mg (500 mg Oral $Given 9/24/23 1226)   HYDROcodone-acetaminophen (NORCO) 5-325 MG per tablet 2 tablet (2 tablets Oral $Given 9/24/23 1226)   dexAMETHasone PF (DECADRON) injection 10 mg (10 mg Intravenous $Given 9/24/23 1225)        Assessments:  0935 I obtained history and examined the patient as noted above.  Reviewed the patient's ultrasound and let her know that it is negative for DVT.  With her neck discomfort and the radiation of pain down her right arm, decreased  sensation and decreased  strength, I am concerned that her symptoms are more consistent with radiculopathy.  Discussed obtaining MRI cervical spine.    1157 I staffed the patient with Dr. Beach     1234 I rechecked the patient and discussed findings and follow up plan with Neurosurgery    Independent Interpretation (X-rays, CTs, rhythm strip):  No DVT seen on ultrasound.    Consultations/Discussion of Management or Tests:  9595 I spoke with Dr. Weinberg, neurosurgery, regarding the patient's history and presentation in the emergency department today. He agrees with IV steroid, muscle relaxant medrol dos Troy. He will see her in clinic.       Social Determinants of Health affecting care:  None     Disposition:  The patient was discharged to home.     Impression & Plan      Medical Decision Making:  Cliff is a pleasant 73-year-old female who came into the emergency department for evaluation of right sided neck, shoulder and arm pain.  She has had multiple visits to emergency departments thus far.  On arrival here she is afebrile and nontoxic-appearing.  So far, she has had x-ray studies of her humerus and shoulder without acute findings.  Had a right upper extremity ultrasound in triage today negative for DVT.  Had recent 9/9 chest pain rule out with negative Trop, chest x-ray, EKG and basic labs.  Patient is not hypoxic, short of breath, tachycardic or tachypneic.  No suspicion for for blood clot as etiology of her symptoms.  On examination, the patient has some decreased sensation in the right upper extremity as compared to the left and has a weak  strength in the right.  I was concerned about cervical radiculopathy as the etiology of her symptoms.  MRI C-spine obtained and returned showing multiple concerns per below.  1.  Multilevel cervical spondylosis.  2.  Moderate bilateral neural foraminal stenosis at C3-C4. Mild to moderate bilateral neural foraminal stenosis at C6-C7 and T1-T2.  3.  Mild spinal  canal stenosis C3-C7.  4.  Active inflammatory arthropathy of the left C1-C2 articulation.  5.  Normal cervical spinal cord signal.    Although there was no neurosurgical emergency and the patient had a normal spinal cord signal, I did consult with Dr. Weinberg of neurosurgery who reviewed her MRI.  We gave her IV steroid here, pain medications and muscle relaxant.  Prescribed muscle relaxant and a Medrol Dosepak with a PPI to go home with.  He will see her closely in neurosurgery clinic as an outpatient for close follow-up on this case.  In the meantime, she can return here if she develops any acutely new symptoms.  Patient felt comfortable with this plan and was discharged home with follow-up with neurosurgery.  Due to need for consultation, I did have her case with Dr. Beach, attending provider.  Please see separate APC supervisory note.  Dr. Beach graciously went in and saw the patient.    Separately, the patient did have an incidental finding on her MRI of a thyroid nodule 2.1 cm.  She will need to follow-up with her PCP closely as an outpatient for ultrasound.  No emergent indication for further imaging studies here in the emergency department setting.    Diagnosis:    ICD-10-CM    1. Cervical radiculopathy  M54.12       2. Cervical stenosis of spinal canal  M48.02       3. Inflammatory arthropathy  M19.90       4. Thyroid nodule  E04.1     2.1cm incidental nodule seen on C spine MRI- needs follow up US outpatient           Discharge Medications:  New Prescriptions    METHYLPREDNISOLONE (MEDROL DOSEPAK) 4 MG TABLET THERAPY PACK    Follow Package Directions    OMEPRAZOLE (PRILOSEC) 20 MG DR CAPSULE    Take 2 capsules (40 mg) by mouth daily for 7 days    TIZANIDINE (ZANAFLEX) 2 MG TABLET    Take 1 tablet (2 mg) by mouth 3 times daily as needed for muscle spasms        Scribe Disclosure:  I, Aris Salgado, am serving as a scribe at 9:39 AM on 9/24/2023 to document services personally performed by Tabatha Martínez  JUAN COREAS based on my observations and the provider's statements to me.    9/24/2023   Tabatha Martínez PA-C Dewing, Jennifer C, PA-C  09/24/23 1232

## 2023-09-24 NOTE — ED NOTES
Pt states she is having intense right shoulder pain from neck down to hand. Chronic pain in which she is suppose to see PT for. However, has been unable to get into PT yet. States they feel like muscle spasms

## 2023-09-24 NOTE — ED TRIAGE NOTES
Pt c/o neck pain that started 2 months ago. Pt reports pain goes into both shoulders, more pain in the right shoulder and down her right arm. Pt reports numbness in her right hand and fingers. Pt seen 2 weeks ago for similar pain. Reports the numbness in her fingers is new. Pt has new swelling to the right shoulder.

## 2023-09-24 NOTE — DISCHARGE INSTRUCTIONS
You were seen and evaluated here in the emergency department for some right sided neck discomfort and right-sided arm pain radiating down to your fingertips with associated numbness and tingling.  You have had multiple x-ray studies at [facility] and were recently ruled out for cardiac causes here on a prior visit.  Your ultrasound was negative for any DVT in the right arm.  With your constellation of symptoms, I was concerned about etiology from your cervical spine.  Your MRI showed inflammatory arthropathy, spondylosis and spinal stenosis.  I called and spoke to the neurosurgeon who will see you in clinic this week.  Please call the office number that I supplied to you.  In the meantime we can start you on a Medrol Dosepak, but it is important to take your omeprazole on it with your history of gastric bypass.  We also will prescribe you a muscle relaxant since you only have 3 doses left of the Flexeril that you were taking prior.  You can continue taking Tylenol 650 mg every 6 hours.

## 2023-09-27 ENCOUNTER — TELEPHONE (OUTPATIENT)
Dept: NEUROSURGERY | Facility: CLINIC | Age: 73
End: 2023-09-27
Payer: COMMERCIAL

## 2023-09-27 NOTE — TELEPHONE ENCOUNTER
Patient returned call and requesting a call back regarding her medications.    Call back # [phone]

## 2023-09-27 NOTE — TELEPHONE ENCOUNTER
Returned call to patient, reviewed information in previous note (separate encounter). Patient verbalized understanding.

## 2023-09-27 NOTE — TELEPHONE ENCOUNTER
"Patient has not been seen/evaluated by neurosurgery team yet.     Darian Weinberg PA-C spoke with ED provider regarding patient. ED provider note states \" I spoke with Dr. Weinberg, neurosurgery, regarding the patient's history and presentation in the emergency department today. He agrees with IV steroid, muscle relaxant medrol dos Troy. He will see her in clinic\".     Patient should contact PCP for any medication requests and follow up as scheduled. Patient can be added to wait list if interested.     Attempted to reach out to patient, no answer. Left voice message for them to call clinic back to further discuss.    "

## 2023-09-27 NOTE — TELEPHONE ENCOUNTER
Patient left message at 3:16p on 09-26-23 requesting a refill of pain medication for steroid refill.  States the pain is increasing and the medication has worn off from the injection.

## 2023-09-29 ENCOUNTER — NURSE TRIAGE (OUTPATIENT)
Dept: NURSING | Facility: CLINIC | Age: 73
End: 2023-09-29
Payer: COMMERCIAL

## 2023-09-29 DIAGNOSIS — M62.838 MUSCLE SPASM: Primary | ICD-10-CM

## 2023-09-29 RX ORDER — METHYLPREDNISOLONE 4 MG
TABLET, DOSE PACK ORAL
Qty: 21 TABLET | Refills: 0 | Status: SHIPPED | OUTPATIENT
Start: 2023-09-29

## 2023-09-29 NOTE — TELEPHONE ENCOUNTER
"Caller states [facility] \"does not have rx\" for medrol dose pack.     Order resent to pharmacy and epic reports received.     Reason for Disposition   Caller has medicine question only, adult not sick, AND triager answers question    Protocols used: Medication Question Call-A-    " V-Y Plasty Text: The defect edges were debeveled with a #15 scalpel blade.  Given the location of the defect, shape of the defect and the proximity to free margins an V-Y advancement flap was deemed most appropriate.  Using a sterile surgical marker, an appropriate advancement flap was drawn incorporating the defect and placing the expected incisions within the relaxed skin tension lines where possible.    The area thus outlined was incised deep to adipose tissue with a #15 scalpel blade.  The skin margins were undermined to an appropriate distance in all directions utilizing iris scissors.

## 2023-10-10 ENCOUNTER — TELEPHONE (OUTPATIENT)
Dept: NEUROSURGERY | Facility: CLINIC | Age: 73
End: 2023-10-10
Payer: COMMERCIAL

## 2023-10-10 NOTE — TELEPHONE ENCOUNTER
Called patient off the wait list, offered to reschedule to 10-11-23@ [facility]

## 2023-10-25 ENCOUNTER — TELEPHONE (OUTPATIENT)
Dept: NEUROSURGERY | Facility: CLINIC | Age: 73
End: 2023-10-25
Payer: COMMERCIAL

## 2023-10-26 ENCOUNTER — OFFICE VISIT (OUTPATIENT)
Dept: NEUROSURGERY | Facility: CLINIC | Age: 73
End: 2023-10-26
Attending: PHYSICIAN ASSISTANT
Payer: COMMERCIAL

## 2023-10-26 VITALS
WEIGHT: 185 LBS | HEIGHT: 65 IN | OXYGEN SATURATION: 98 % | HEART RATE: 81 BPM | SYSTOLIC BLOOD PRESSURE: 129 MMHG | DIASTOLIC BLOOD PRESSURE: 83 MMHG | BODY MASS INDEX: 30.82 KG/M2

## 2023-10-26 DIAGNOSIS — M75.41 ROTATOR CUFF IMPINGEMENT SYNDROME, RIGHT: ICD-10-CM

## 2023-10-26 DIAGNOSIS — M75.42 ROTATOR CUFF IMPINGEMENT SYNDROME, LEFT: ICD-10-CM

## 2023-10-26 DIAGNOSIS — M54.2 CERVICALGIA: ICD-10-CM

## 2023-10-26 DIAGNOSIS — M75.52 SUBACROMIAL BURSITIS OF BOTH SHOULDERS: Primary | ICD-10-CM

## 2023-10-26 DIAGNOSIS — M67.912 TENDINOPATHY OF ROTATOR CUFF, LEFT: ICD-10-CM

## 2023-10-26 DIAGNOSIS — M75.51 SUBACROMIAL BURSITIS OF BOTH SHOULDERS: Primary | ICD-10-CM

## 2023-10-26 DIAGNOSIS — M67.911 TENDINOPATHY OF ROTATOR CUFF, RIGHT: Primary | ICD-10-CM

## 2023-10-26 PROCEDURE — G0463 HOSPITAL OUTPT CLINIC VISIT: HCPCS | Performed by: PHYSICIAN ASSISTANT

## 2023-10-26 PROCEDURE — 99203 OFFICE O/P NEW LOW 30 MIN: CPT | Performed by: PHYSICIAN ASSISTANT

## 2023-10-26 ASSESSMENT — PAIN SCALES - GENERAL: PAINLEVEL: SEVERE PAIN (6)

## 2023-10-26 NOTE — PROGRESS NOTES
"Ghada Dillon is a 73 year old female who presents for:  Chief Complaint   Patient presents with    RECHECK     Cervical stenosis of spinal canal         Initial Vitals:  /83   Pulse 81   Ht 1.651 m (5' 5\")   Wt 83.9 kg (185 lb)   SpO2 98%   BMI 30.79 kg/m   Estimated body mass index is 30.79 kg/m  as calculated from the following:    Height as of this encounter: 1.651 m (5' 5\").    Weight as of this encounter: 83.9 kg (185 lb).. Body surface area is 1.96 meters squared. BP completed using cuff size: X-large  Severe Pain (6)    Nursing Comments:     Lauren Escobar MA    "

## 2023-10-26 NOTE — PROGRESS NOTES
Per STANTON Proctor (neurosurgery), ordered bilateral subacromial-subdeltoid bursa corticosteroid injections for Ms. Ghada Dillon for bilateral rotator cuff tendinopathy/impingement syndrome.    Hollis Zacarias MD

## 2023-10-26 NOTE — PROGRESS NOTES
NEUROSURGERY CLINIC CONSULT NOTE     DATE OF VISIT: 10/26/2023     SUBJECTIVE:     Ghada Dillon is a pleasant 73 year old female who presents to the clinic today for consultation on cervical spine. She is referred to the Neurosurgery Clinic by Dr. Elliott in Primary Care.    Today, she reports a multi-month history of symptoms. She describes a daily with fluctuating intensity, sharp, aching, burning pain that initiates in the midline cervical region and radiates distally primarily to her shoulder and then further down to to her left hand intermittently. This pain is accompanied by perceived weakness in the same distribution. Any type of glenohumeral mobility seems to aggravate the symptoms, while alleviation is obtained by rest. No mechanism of injury such as trauma or a fall is associated with the onset of the pain. She denies changes in gait, instability, or falling episodes. There has been no significant change in her handwriting or hand dexterity.   She has not participated in conservative therapies.        Current Outpatient Medications:     albuterol (2.5 MG/3ML) 0.083% neb solution, Take 1 vial (2.5 mg) by nebulization every 6 hours as needed for shortness of breath / dyspnea or wheezing, Disp: 75 mL, Rfl: 0    albuterol (PROAIR HFA) 108 (90 BASE) MCG/ACT Inhaler, Inhale 2 puffs into the lungs every 4 hours as needed for shortness of breath / dyspnea, Disp: 1 Inhaler, Rfl: 0    ALBUTEROL IN, , Disp: , Rfl:     conjugated estrogens (PREMARIN) vaginal cream, Place vaginally once a week, Disp: , Rfl:     cyclobenzaprine (FLEXERIL) 10 MG tablet, Take 1 tablet (10 mg) by mouth 3 times daily as needed for muscle spasms, Disp: 15 tablet, Rfl: 0    latanoprost (XALATAN) 0.005 % ophthalmic solution, 1 drop At Bedtime, Disp: , Rfl:     methylPREDNISolone (MEDROL DOSEPAK) 4 MG tablet therapy pack, Follow Package Directions, Disp: 21 tablet, Rfl: 0    tiZANidine (ZANAFLEX) 2 MG tablet, Take 1 tablet (2 mg) by  "mouth 3 times daily as needed for muscle spasms, Disp: 15 tablet, Rfl: 0     No Known Allergies     Past Medical History:   Diagnosis Date    Asthma         ROS: 10 point review of symptoms are negative other than the symptoms noted above in the HPI.     Family History has been reviewed with the patient, there are no pertinent findings to presenting concern.     Past Surgical History:   Procedure Laterality Date    EYE SURGERY          Social History     Tobacco Use    Smoking status: Some Days     Types: Cigarettes    Smokeless tobacco: Never    Tobacco comments:     1 to 2 cigs a month   Substance Use Topics    Alcohol use: No    Drug use: Never        OBJECTIVE:   /83   Pulse 81   Ht 1.651 m (5' 5\")   Wt 83.9 kg (185 lb)   SpO2 98%   BMI 30.79 kg/m     Body mass index is 30.79 kg/m .     Imaging:     MR CERVICAL SPINE WITHOUT CONTRAST  LOCATION: [facility]  DATE: 09/24/2023    Findings, per radiologist read, notable for:      1.  Multilevel cervical spondylosis.  2.  Moderate bilateral neural foraminal stenosis at C3-C4. Mild to moderate bilateral neural foraminal stenosis at C6-C7 and T1-T2.  3.  Mild spinal canal stenosis C3-C7.  4.  Active inflammatory arthropathy of the left C1-C2 articulation.  5.  Normal cervical spinal cord signal.  6.  Partially visualized left thyroid lobe nodule measuring up to 2.1 cm. Recommend thyroid ultrasound for further evaluation.    Full radiological report in chart. Imaging was reviewed with with patient today.     Exam:     Patient appears comfortable, conversational, and in no apparent distress.   Head: Normocephalic, without obvious abnormality, atraumatic, no facial asymmetry.   Eyes: conjunctivae/corneas clear. PERRL, EOM's intact.   Throat: lips, mucosa, and tongue normal; teeth and gums normal.   Neck: supple, symmetrical, trachea midline, no adenopathy and thyroid: not enlarged, symmetric, no tenderness/mass/nodules.   Lungs: clear to " auscultation bilaterally.   Heart: regular rate and rhythm.   Abdomen: soft, non-tender; bowel sounds normal; no masses, no organomegaly.   Pulses: 2+ and symmetric.   Skin: Skin color, texture, turgor normal. No rashes or lesions.     CN II-XII grossly intact, alert and appropriate with conversation and following commands.   Gait is non-antalgic. Able to tandem walk. Able to walk on toes and heels without difficulty.   Cervical spine is non tender to palpation. Diminished range of motion of neck, not concerning for lhermitte's phenomenon.   Bilateral bicep 2/4 and tricep reflexes 1/4. Sensation intact throughout upper extremities.     UE muscle strength  Right  Left    Deltoid  5/5  5/5    Biceps  5/5  5/5    Triceps  5/5  5/5    Hand intrinsics  5/5  5/5    Hand grasp  5/5  5/5    Mtz signs  neg  neg      Deltoid strength diminished 2/2 pain with movement.  Tender to palpation to glenohumeral / subacromial space bilaterally.   Lumbar spine is non tender to palpation.  Intact sensation throughout lower extremities.   Bilateral patellar 2/4 and achilles reflex 1/4.     LE muscle strength  Right  Left    Iliopsoas (hip flexion)  5/5  5/5    Quad (knee extension)  5/5  5/5    Hamstring (knee flexion)  5/5  5/5    Gastrocnemius (PF)  5/5  5/5    Tibialis Ant. (DF)  5/5  5/5    EHL  5/5  5/5      Negative Babinski bilaterally. Negative for clonus.   Calves are soft and non-tender bilaterally.       ASSESSMENT/PLAN:     Ghada Dillon is a 73 year old female who presents to the clinic for consultation on what sounds like bilateral subacromial bursitis and cervicalgia. The patient's most recent imaging was reviewed with her today. It was explained that images show multilevel cervical spondylosis with normal cervical spinal cord signal. On exam, she is noted to have a positive neer and ricky test with diminished range of motion. She has not attempted conservative management.     Based on her physical exam,  imaging review, and past treatments, we feel that it would be in her best interest to try a conservative approach by participating in a physical therapy program to include shoulder and neck treatments. We will also send her to Dr. Zacarias for bilateral subacromial injections. We would like to see her back as needed to further discuss possible surgical interventions or other conservative therapies. We also discussed signs of a worsening problem that she should seek being evaluated.        Respectfully,     Elias Weinberg [facility], PA-JOSS  [facility]  [facility]  Tel: [phone]      Exam, imaging, and plan reviewed by Dr. Mccauley.

## 2023-10-26 NOTE — LETTER
10/26/2023         RE: Ghada Dillon  [address]  [facility] [address]        Dear Colleague,    Thank you for referring your patient, Ghada Dillon, to the [facility]UROS[facility]GERY [facility] [place]. Please see a copy of my visit note below.    NEUROSURGERY CLINIC CONSULT NOTE     DATE OF VISIT: 10/26/2023     SUBJECTIVE:     Ghada Dillon is a pleasant 73 year old female who presents to the clinic today for consultation on cervical spine. She is referred to the Neurosurgery Clinic by Dr. Elliott in Primary Care.    Today, she reports a multi-month history of symptoms. She describes a daily with fluctuating intensity, sharp, aching, burning pain that initiates in the midline cervical region and radiates distally primarily to her shoulder and then further down to to her left hand intermittently. This pain is accompanied by perceived weakness in the same distribution. Any type of glenohumeral mobility seems to aggravate the symptoms, while alleviation is obtained by rest. No mechanism of injury such as trauma or a fall is associated with the onset of the pain. She denies changes in gait, instability, or falling episodes. There has been no significant change in her handwriting or hand dexterity.   She has not participated in conservative therapies.        Current Outpatient Medications:      albuterol (2.5 MG/3ML) 0.083% neb solution, Take 1 vial (2.5 mg) by nebulization every 6 hours as needed for shortness of breath / dyspnea or wheezing, Disp: 75 mL, Rfl: 0     albuterol (PROAIR HFA) 108 (90 BASE) MCG/ACT Inhaler, Inhale 2 puffs into the lungs every 4 hours as needed for shortness of breath / dyspnea, Disp: 1 Inhaler, Rfl: 0     ALBUTEROL IN, , Disp: , Rfl:      conjugated estrogens (PREMARIN) vaginal cream, Place vaginally once a week, Disp: , Rfl:      cyclobenzaprine (FLEXERIL) 10 MG tablet, Take 1 tablet (10 mg) by mouth 3 times daily as needed for muscle  "spasms, Disp: 15 tablet, Rfl: 0     latanoprost (XALATAN) 0.005 % ophthalmic solution, 1 drop At Bedtime, Disp: , Rfl:      methylPREDNISolone (MEDROL DOSEPAK) 4 MG tablet therapy pack, Follow Package Directions, Disp: 21 tablet, Rfl: 0     tiZANidine (ZANAFLEX) 2 MG tablet, Take 1 tablet (2 mg) by mouth 3 times daily as needed for muscle spasms, Disp: 15 tablet, Rfl: 0     No Known Allergies     Past Medical History:   Diagnosis Date     Asthma         ROS: 10 point review of symptoms are negative other than the symptoms noted above in the HPI.     Family History has been reviewed with the patient, there are no pertinent findings to presenting concern.     Past Surgical History:   Procedure Laterality Date     EYE SURGERY          Social History     Tobacco Use     Smoking status: Some Days     Types: Cigarettes     Smokeless tobacco: Never     Tobacco comments:     1 to 2 cigs a month   Substance Use Topics     Alcohol use: No     Drug use: Never        OBJECTIVE:   /83   Pulse 81   Ht 1.651 m (5' 5\")   Wt 83.9 kg (185 lb)   SpO2 98%   BMI 30.79 kg/m     Body mass index is 30.79 kg/m .     Imaging:     MR CERVICAL SPINE WITHOUT CONTRAST  LOCATION: [facility]  DATE: 09/24/2023    Findings, per radiologist read, notable for:      1.  Multilevel cervical spondylosis.  2.  Moderate bilateral neural foraminal stenosis at C3-C4. Mild to moderate bilateral neural foraminal stenosis at C6-C7 and T1-T2.  3.  Mild spinal canal stenosis C3-C7.  4.  Active inflammatory arthropathy of the left C1-C2 articulation.  5.  Normal cervical spinal cord signal.  6.  Partially visualized left thyroid lobe nodule measuring up to 2.1 cm. Recommend thyroid ultrasound for further evaluation.    Full radiological report in chart. Imaging was reviewed with with patient today.     Exam:     Patient appears comfortable, conversational, and in no apparent distress.   Head: Normocephalic, without obvious " abnormality, atraumatic, no facial asymmetry.   Eyes: conjunctivae/corneas clear. PERRL, EOM's intact.   Throat: lips, mucosa, and tongue normal; teeth and gums normal.   Neck: supple, symmetrical, trachea midline, no adenopathy and thyroid: not enlarged, symmetric, no tenderness/mass/nodules.   Lungs: clear to auscultation bilaterally.   Heart: regular rate and rhythm.   Abdomen: soft, non-tender; bowel sounds normal; no masses, no organomegaly.   Pulses: 2+ and symmetric.   Skin: Skin color, texture, turgor normal. No rashes or lesions.     CN II-XII grossly intact, alert and appropriate with conversation and following commands.   Gait is non-antalgic. Able to tandem walk. Able to walk on toes and heels without difficulty.   Cervical spine is non tender to palpation. Diminished range of motion of neck, not concerning for lhermitte's phenomenon.   Bilateral bicep 2/4 and tricep reflexes 1/4. Sensation intact throughout upper extremities.     UE muscle strength  Right  Left    Deltoid  5/5  5/5    Biceps  5/5  5/5    Triceps  5/5  5/5    Hand intrinsics  5/5  5/5    Hand grasp  5/5  5/5    Mtz signs  neg  neg      Deltoid strength diminished 2/2 pain with movement.  Tender to palpation to glenohumeral / subacromial space bilaterally.   Lumbar spine is non tender to palpation.  Intact sensation throughout lower extremities.   Bilateral patellar 2/4 and achilles reflex 1/4.     LE muscle strength  Right  Left    Iliopsoas (hip flexion)  5/5  5/5    Quad (knee extension)  5/5  5/5    Hamstring (knee flexion)  5/5  5/5    Gastrocnemius (PF)  5/5  5/5    Tibialis Ant. (DF)  5/5  5/5    EHL  5/5  5/5      Negative Babinski bilaterally. Negative for clonus.   Calves are soft and non-tender bilaterally.       ASSESSMENT/PLAN:     Ghada Dillon is a 73 year old female who presents to the clinic for consultation on what sounds like bilateral subacromial bursitis and cervicalgia. The patient's most recent imaging  "was reviewed with her today. It was explained that images show multilevel cervical spondylosis with normal cervical spinal cord signal. On exam, she is noted to have a positive neer and ricky test with diminished range of motion. She has not attempted conservative management.     Based on her physical exam, imaging review, and past treatments, we feel that it would be in her best interest to try a conservative approach by participating in a physical therapy program to include shoulder and neck treatments. We will also send her to Dr. Zacarias for bilateral subacromial injections. We would like to see her back as needed to further discuss possible surgical interventions or other conservative therapies. We also discussed signs of a worsening problem that she should seek being evaluated.        Respectfully,     TAYLOR Carroll PA-C [facility]  [facility]  Tel: [phone]      Exam, imaging, and plan reviewed by Dr. Mccauley.     Ghada Dillon is a 73 year old female who presents for:  Chief Complaint   Patient presents with     RECHECK     Cervical stenosis of spinal canal         Initial Vitals:  /83   Pulse 81   Ht 1.651 m (5' 5\")   Wt 83.9 kg (185 lb)   SpO2 98%   BMI 30.79 kg/m   Estimated body mass index is 30.79 kg/m  as calculated from the following:    Height as of this encounter: 1.651 m (5' 5\").    Weight as of this encounter: 83.9 kg (185 lb).. Body surface area is 1.96 meters squared. BP completed using cuff size: X-large  Severe Pain (6)    Nursing Comments:     Lauren Escobar MA      Again, thank you for allowing me to participate in the care of your patient.        Sincerely,        Darian Weinberg PA-C  "

## 2023-10-27 ENCOUNTER — TELEPHONE (OUTPATIENT)
Dept: NEUROSURGERY | Facility: CLINIC | Age: 73
End: 2023-10-27
Payer: COMMERCIAL

## 2023-10-27 ENCOUNTER — TELEPHONE (OUTPATIENT)
Dept: ORTHOPEDICS | Facility: CLINIC | Age: 73
End: 2023-10-27
Payer: COMMERCIAL

## 2023-10-27 NOTE — TELEPHONE ENCOUNTER
[facility] Call Center    Phone Message    May a detailed message be left on voicemail: yes     Reason for Call: Other: Pt is in excruciating pain and needs to talk to Dr. Zacarias or care team.  She can't sleep, can't sit, can't stand.  She needs a call back to discuss Rx for pain and pain management suggestions. Thanks.      Action Taken: Message routed to:  Other: [facility]    Travel Screening: Not Applicable

## 2023-10-27 NOTE — TELEPHONE ENCOUNTER
Patient seen by Darian Weinberg PA-C yesterday who recommended PT and an injection with Dr. Zacarias. Injection is scheduled for 11/9.     Patient calling to discuss pain management.     As a neurosurgical specialty clinic we are unable to provided medication prior to patients undergoing surgery with our team. If OTC medications such as tylenol, ibuprofen, lidocaine patches and ice/heat are not working patient should contact PCP to discuss other pain medications. Severe uncontrolled pain or red flag symptoms would warrant ED trip.     Called patient to discuss and provide above update. She verbalized understanding.

## 2023-10-27 NOTE — TELEPHONE ENCOUNTER
[facility] Call Center    Phone Message    May a detailed message be left on voicemail: yes     Reason for Call: Other: Patient is calling because she is having severe pain and is in need of pain medication to last until her injection on 11/9     Action Taken: Other: [id number]    Travel Screening: Not Applicable

## 2023-10-30 ENCOUNTER — TELEPHONE (OUTPATIENT)
Dept: NEUROSURGERY | Facility: CLINIC | Age: 73
End: 2023-10-30
Payer: COMMERCIAL

## 2023-10-30 ENCOUNTER — TELEPHONE (OUTPATIENT)
Dept: ORTHOPEDICS | Facility: CLINIC | Age: 73
End: 2023-10-30
Payer: COMMERCIAL

## 2023-10-30 DIAGNOSIS — M54.2 CERVICALGIA: ICD-10-CM

## 2023-10-30 DIAGNOSIS — M75.51 SUBACROMIAL BURSITIS OF BOTH SHOULDERS: Primary | ICD-10-CM

## 2023-10-30 DIAGNOSIS — M75.52 SUBACROMIAL BURSITIS OF BOTH SHOULDERS: Primary | ICD-10-CM

## 2023-10-30 NOTE — TELEPHONE ENCOUNTER
[facility] Call [facility]    Phone Message    May a detailed message be left on voicemail: yes     Reason for Call: Symptoms or Concerns     If patient has red-flag symptoms, warm transfer to triage line    Current symptom or concern: Pt is experiencing muscle tension in nape of her neck that goes down to her arms so she can barely lift her arms and is having extreme pain and daughter is calling to see if there is anything  can prescribe for the Pt for the time being until she is able to get the injection. Writer did also transfer Pt daughter to [phone] to see if possible sooner appt time than 11/9 so Pt doesn't have to wait so long to be seen. If not able to get in sooner, please call back to advise on medication options for pain.     Symptoms have been present for:  2 month(s)    Has patient previously been seen for this? Yes    By :     Date: not sure    Are there any new or worsening symptoms? Yes: worse the last couple weeks    Action Taken: Message routed to:  Other: [facility]    Travel Screening: Not Applicable

## 2023-10-30 NOTE — TELEPHONE ENCOUNTER
[facility] Call Center    Phone Message    May a detailed message be left on voicemail: yes     Reason for Call: Other: Patient's daughter is calling to see if patient can be seen sooner for injection due to pain. Daughter would like this to be an urgent order     Action Taken: Other: [id number]    Travel Screening: Not Applicable

## 2023-10-31 NOTE — TELEPHONE ENCOUNTER
Patient scheduled for injection with Dr. Zacarias on 11/9/23, they would need to contact his team to see if any sooner appointments are available.     No C2C on file.     Called patients daughter who put her mother on a 3 way call.     Spoke with both of them regarding OTC pain control options and presenting to ED for severe uncontrolled pain.     They would like to see if the injection can be completed sooner with [facility].     Updated referral placed, sent to [facility] team high priority for faxing.

## 2023-10-31 NOTE — TELEPHONE ENCOUNTER
Faxed Form October 31, 2023 to fax number [phone]    Right Fax confirmed at 900 AM    Shaina Curtis

## 2023-11-06 ENCOUNTER — TELEPHONE (OUTPATIENT)
Dept: GENERAL RADIOLOGY | Facility: CLINIC | Age: 73
End: 2023-11-06
Payer: COMMERCIAL

## 2023-11-06 NOTE — TELEPHONE ENCOUNTER
Call placed to pt to review upcoming procedure, medications, allergies and instructions for injection. No answer, left our number on voicemail to call us PRN

## 2023-11-09 ENCOUNTER — HOSPITAL ENCOUNTER (OUTPATIENT)
Dept: ULTRASOUND IMAGING | Facility: CLINIC | Age: 73
Discharge: HOME | End: 2023-11-09
Attending: PHYSICAL MEDICINE & REHABILITATION | Admitting: PHYSICAL MEDICINE & REHABILITATION
Payer: COMMERCIAL

## 2023-11-09 VITALS
RESPIRATION RATE: 18 BRPM | OXYGEN SATURATION: 99 % | DIASTOLIC BLOOD PRESSURE: 82 MMHG | HEART RATE: 83 BPM | SYSTOLIC BLOOD PRESSURE: 127 MMHG

## 2023-11-09 DIAGNOSIS — M67.912 TENDINOPATHY OF ROTATOR CUFF, LEFT: ICD-10-CM

## 2023-11-09 DIAGNOSIS — M67.911 TENDINOPATHY OF ROTATOR CUFF, RIGHT: ICD-10-CM

## 2023-11-09 DIAGNOSIS — M75.41 ROTATOR CUFF IMPINGEMENT SYNDROME, RIGHT: ICD-10-CM

## 2023-11-09 DIAGNOSIS — M75.42 ROTATOR CUFF IMPINGEMENT SYNDROME, LEFT: ICD-10-CM

## 2023-11-09 PROCEDURE — 20611 DRAIN/INJ JOINT/BURSA W/US: CPT | Mod: 50

## 2023-11-09 PROCEDURE — 250N000009 HC RX 250

## 2023-11-09 PROCEDURE — 250N000011 HC RX IP 250 OP 636: Mod: JZ

## 2023-11-09 PROCEDURE — 20611 DRAIN/INJ JOINT/BURSA W/US: CPT | Mod: 50 | Performed by: PHYSICAL MEDICINE & REHABILITATION

## 2023-11-09 PROCEDURE — 250N000011 HC RX IP 250 OP 636: Mod: JZ | Performed by: PHYSICAL MEDICINE & REHABILITATION

## 2023-11-09 RX ORDER — TRIAMCINOLONE ACETONIDE 40 MG/ML
INJECTION, SUSPENSION INTRA-ARTICULAR; INTRAMUSCULAR
Status: COMPLETED
Start: 2023-11-09 | End: 2023-11-09

## 2023-11-09 RX ORDER — LIDOCAINE HYDROCHLORIDE 10 MG/ML
INJECTION, SOLUTION EPIDURAL; INFILTRATION; INTRACAUDAL; PERINEURAL
Status: COMPLETED
Start: 2023-11-09 | End: 2023-11-09

## 2023-11-09 RX ORDER — TRIAMCINOLONE ACETONIDE 40 MG/ML
40 INJECTION, SUSPENSION INTRA-ARTICULAR; INTRAMUSCULAR ONCE
Status: COMPLETED | OUTPATIENT
Start: 2023-11-09 | End: 2023-11-09

## 2023-11-09 RX ORDER — LIDOCAINE HYDROCHLORIDE 10 MG/ML
5 INJECTION, SOLUTION EPIDURAL; INFILTRATION; INTRACAUDAL; PERINEURAL ONCE
Status: COMPLETED | OUTPATIENT
Start: 2023-11-09 | End: 2023-11-09

## 2023-11-09 RX ORDER — BUPIVACAINE HYDROCHLORIDE 2.5 MG/ML
8 INJECTION, SOLUTION EPIDURAL; INFILTRATION; INTRACAUDAL ONCE
Status: COMPLETED | OUTPATIENT
Start: 2023-11-09 | End: 2023-11-09

## 2023-11-09 RX ORDER — BUPIVACAINE HYDROCHLORIDE 2.5 MG/ML
INJECTION, SOLUTION EPIDURAL; INFILTRATION; INTRACAUDAL
Status: COMPLETED
Start: 2023-11-09 | End: 2023-11-09

## 2023-11-09 RX ADMIN — LIDOCAINE HYDROCHLORIDE 5 ML: 10 INJECTION, SOLUTION EPIDURAL; INFILTRATION; INTRACAUDAL; PERINEURAL at 14:52

## 2023-11-09 RX ADMIN — TRIAMCINOLONE ACETONIDE 40 MG: 40 INJECTION, SUSPENSION INTRA-ARTICULAR; INTRAMUSCULAR at 15:04

## 2023-11-09 RX ADMIN — BUPIVACAINE HYDROCHLORIDE 20 MG: 2.5 INJECTION, SOLUTION EPIDURAL; INFILTRATION; INTRACAUDAL; PERINEURAL at 15:03

## 2023-11-09 RX ADMIN — BUPIVACAINE HYDROCHLORIDE 20 MG: 2.5 INJECTION, SOLUTION EPIDURAL; INFILTRATION; INTRACAUDAL at 15:03

## 2023-11-09 NOTE — PROCEDURES
"PHYSICAL MEDICINE & REHABILITATION / MEDICAL SPINE      PROCEDURE DATE:  Nov 9, 2023      PATIENT NAME:  Ghada Dillon  MRN:  [id number]  YOB: 1950      PRE-PROCEDURE DIAGNOSIS:  1. Tendinopathy of rotator cuff, right    2. Tendinopathy of rotator cuff, left    3. Rotator cuff impingement syndrome, right    4. Rotator cuff impingement syndrome, left        POST-PROCEDURE DIAGNOSIS:  1. Tendinopathy of rotator cuff, right    2. Tendinopathy of rotator cuff, left    3. Rotator cuff impingement syndrome, right    4. Rotator cuff impingement syndrome, left        PROCEDURE:  Ultrasound-guided bilateral subacromial-subdeltoid bursa injections.  (CPT Code:  08202)      PROCEDURE IN DETAIL:  Prior to the procedure, educational material was provided for the patient to review and take home.  After a discussion of the risks, benefits, and alternatives to the procedure, the patient expressed understanding and wished to proceed.  Consent form was signed.  The patient was brought to the ultrasound suite and placed in the seated position.  Procedural pause was conducted to verify:  patient identity, procedure to be performed, laterality, site, and patient position.    The affected area was scanned using a linear ultrasound probe.  The bilateral subacromial-subdeltoid bursa were identified, and the ideal injection sites were marked.  The skin was then cleaned and prepped using chlorhexidine.  A sterile transducer cover was then placed on the transducer.    The injection site was then scanned again using the sterile transducer probe.  The right subacromial-subdeltoid bursa was identified under ultrasound.  The skin and subcutaneous tissues were anesthetized using 27g 1.25\" needle with 2.5 ml 1% preservative-free lidocaine.  Next, 25g 2\" needle was directed using ultrasound guidance for an in-plane approach into the subacromial-subdeltoid bursa.  The needle was then aspirated.  After negative aspiration, 40 " "mg triamcinolone and 4 ml 0.25% bupivacaine were injected into the subacromial-subdeltoid bursa.  Images of proper needle position and injectate solution were saved.  The needle was then removed.     The injection site was then scanned again using the sterile transducer probe.  The left subacromial-subdeltoid bursa was identified under ultrasound.  The skin and subcutaneous tissues were anesthetized using 27g 1.25\" needle with 2.5 ml 1% preservative-free lidocaine.  Next, 25g 2\" needle was directed using ultrasound guidance for an in-plane approach into the subacromial-subdeltoid bursa.  The needle was then aspirated.  After negative aspiration, 40 mg triamcinolone and 4 ml 0.25% bupivacaine were injected into the subacromial-subdeltoid bursa.  Images of proper needle position and injectate solution were saved.  The needle was then removed.     The skin was wiped clean, and bandages were placed as appropriate.  There were no apparent complications.  The patient tolerated the procedure well.  The patient was observed for an appropriate period of time and discharged in excellent condition.    Preprocedure pain level: 8/10.  Postprocedure pain level: 6/10.      ATTENDING:  Hollis Zacarias MD   RESIDENT:  Arjun Kirby MD  "

## 2023-11-09 NOTE — PROGRESS NOTES
Assisted MD Zacarias in bilateral subacromial-subdeltoid bursa injections. Medications given per MAR. Pain prior to procedure 8/10 bilateral shoulder. Pain post procedure 6/10. Pt tolerated procedure well. Injection site covered with dressing. Dressing clean, dry and intact at time of discharge. Discharge instructions given and all questions answered. Pt left ambulatory in stable condition.